# Patient Record
Sex: MALE | Race: WHITE | Employment: UNEMPLOYED
[De-identification: names, ages, dates, MRNs, and addresses within clinical notes are randomized per-mention and may not be internally consistent; named-entity substitution may affect disease eponyms.]

---

## 2022-03-18 PROBLEM — Z86.2 HISTORY OF ANEMIA: Status: ACTIVE | Noted: 2020-09-10

## 2022-03-18 PROBLEM — Z86.0100 HISTORY OF COLON POLYPS: Status: ACTIVE | Noted: 2021-02-15

## 2022-03-18 PROBLEM — Z86.010 HISTORY OF COLON POLYPS: Status: ACTIVE | Noted: 2021-02-15

## 2022-03-19 PROBLEM — R25.1 TREMOR: Status: ACTIVE | Noted: 2020-09-10

## 2022-03-19 PROBLEM — R68.82 DECREASED LIBIDO: Status: ACTIVE | Noted: 2021-02-15

## 2022-03-19 PROBLEM — E66.09 CLASS 2 OBESITY DUE TO EXCESS CALORIES WITHOUT SERIOUS COMORBIDITY WITH BODY MASS INDEX (BMI) OF 36.0 TO 36.9 IN ADULT: Status: ACTIVE | Noted: 2020-09-10

## 2022-03-19 PROBLEM — G89.29 CHRONIC PAIN OF RIGHT KNEE: Status: ACTIVE | Noted: 2020-09-10

## 2022-03-19 PROBLEM — E66.812 CLASS 2 OBESITY DUE TO EXCESS CALORIES WITHOUT SERIOUS COMORBIDITY WITH BODY MASS INDEX (BMI) OF 36.0 TO 36.9 IN ADULT: Status: ACTIVE | Noted: 2020-09-10

## 2022-03-19 PROBLEM — M25.561 CHRONIC PAIN OF RIGHT KNEE: Status: ACTIVE | Noted: 2020-09-10

## 2022-03-20 PROBLEM — R35.1 NOCTURIA: Status: ACTIVE | Noted: 2021-02-15

## 2023-01-27 ENCOUNTER — OFFICE VISIT (OUTPATIENT)
Dept: FAMILY MEDICINE CLINIC | Facility: CLINIC | Age: 68
End: 2023-01-27

## 2023-01-27 VITALS
WEIGHT: 267.2 LBS | RESPIRATION RATE: 19 BRPM | HEART RATE: 82 BPM | DIASTOLIC BLOOD PRESSURE: 115 MMHG | BODY MASS INDEX: 35.41 KG/M2 | SYSTOLIC BLOOD PRESSURE: 185 MMHG | OXYGEN SATURATION: 99 % | HEIGHT: 73 IN | TEMPERATURE: 98.4 F

## 2023-01-27 DIAGNOSIS — G89.29 CHRONIC PAIN OF RIGHT KNEE: Primary | ICD-10-CM

## 2023-01-27 DIAGNOSIS — M25.561 CHRONIC PAIN OF RIGHT KNEE: Primary | ICD-10-CM

## 2023-01-27 DIAGNOSIS — I10 ESSENTIAL (PRIMARY) HYPERTENSION: ICD-10-CM

## 2023-01-27 DIAGNOSIS — M17.11 PRIMARY OSTEOARTHRITIS OF RIGHT KNEE: ICD-10-CM

## 2023-01-27 RX ORDER — TRIAMCINOLONE ACETONIDE 40 MG/ML
40 INJECTION, SUSPENSION INTRA-ARTICULAR; INTRAMUSCULAR ONCE
Status: COMPLETED | OUTPATIENT
Start: 2023-01-27 | End: 2023-01-27

## 2023-01-27 RX ORDER — AMLODIPINE BESYLATE AND BENAZEPRIL HYDROCHLORIDE 5; 10 MG/1; MG/1
1 CAPSULE ORAL DAILY
Qty: 30 CAPSULE | Refills: 5 | Status: SHIPPED | OUTPATIENT
Start: 2023-01-27

## 2023-01-27 RX ADMIN — TRIAMCINOLONE ACETONIDE 40 MG: 40 INJECTION, SUSPENSION INTRA-ARTICULAR; INTRAMUSCULAR at 16:03

## 2023-01-27 SDOH — ECONOMIC STABILITY: FOOD INSECURITY: WITHIN THE PAST 12 MONTHS, YOU WORRIED THAT YOUR FOOD WOULD RUN OUT BEFORE YOU GOT MONEY TO BUY MORE.: NEVER TRUE

## 2023-01-27 SDOH — ECONOMIC STABILITY: FOOD INSECURITY: WITHIN THE PAST 12 MONTHS, THE FOOD YOU BOUGHT JUST DIDN'T LAST AND YOU DIDN'T HAVE MONEY TO GET MORE.: NEVER TRUE

## 2023-01-27 ASSESSMENT — ENCOUNTER SYMPTOMS
SHORTNESS OF BREATH: 0
BACK PAIN: 0

## 2023-01-27 ASSESSMENT — PATIENT HEALTH QUESTIONNAIRE - PHQ9
1. LITTLE INTEREST OR PLEASURE IN DOING THINGS: 0
SUM OF ALL RESPONSES TO PHQ QUESTIONS 1-9: 0
2. FEELING DOWN, DEPRESSED OR HOPELESS: 0
SUM OF ALL RESPONSES TO PHQ9 QUESTIONS 1 & 2: 0

## 2023-01-27 ASSESSMENT — SOCIAL DETERMINANTS OF HEALTH (SDOH): HOW HARD IS IT FOR YOU TO PAY FOR THE VERY BASICS LIKE FOOD, HOUSING, MEDICAL CARE, AND HEATING?: NOT HARD AT ALL

## 2023-01-27 NOTE — PATIENT INSTRUCTIONS
*Blood pressure is elevated today with goal at < 140/90. Limit your sodium intake to less than 2 grams daily. A weight loss of 5-10 pounds can also be helpful. Avoid excessive alcohol and caffeine. Start Lotrel - take once a day. Check your blood pressure 3-4 times a week or more frequently if any problems, record them, and bring them in for us to review at your next visit. *You were given a steroid injection today. Sometimes this can cause a flare of discomfort the day after. You can apply ice (for 10 minutes at a time) and use the Tylenol if needed. Typically it takes 24-48 hours before the steroid \"kicks in\" and provides relief of the symptoms.

## 2023-01-27 NOTE — PROGRESS NOTES
[facility]  Phone [phone]      Patient: Luly Hendrix  YOB: 1955  Age 79 y.o. Sex male  Medical Record:  [id number]  Visit Date: 01/27/23  Author:  Anthony Montague PA-C    [facility] Note    Chief Complaint   Patient presents with    Knee Pain     Right, had first shot from Dr. Kelley Benson in Sept of 2020. Lasted until now. . gradually wore off but thinks he needs another injection     The longer he sits the stiffer his knee gets        History of Present Illness  This is a 29-year-old male who has a history of chronic right knee pain and is presenting today with complaints of an exacerbation. He has known osteoarthritis of this knee. He received a corticosteroid injection in his knee 9/2020 and reports that he did very well afterwards. In fact he states that his pain has been well controlled until the last couple of weeks. He denies any injury or trauma but states that he has pain with weightbearing and is noting stiffness of the knee especially after sitting for long periods of time. He has had some occasional swelling but notes that it has been minimal.  Denies any redness or increased warmth of the overlying skin. Pain is primarily at the medial aspect of the knee. He denies any painful locking, popping, catching or buckling of the knee. In review of patient's chart it appears that he was diagnosed with hypertension previously. He was given a prescription for lisinopril but patient admits that he cannot remember ever taking it. He does not check his blood pressure at home. He denies chest pain, shortness of breath, palpitations or lower extremity edema. His last office visit here was 7/2021.     Past History:    Past Medical history   Past Medical History:   Diagnosis Date    Chronic pain of right knee 9/10/2020    GERD (gastroesophageal reflux disease)     History of anemia 9/10/2020    PUD (peptic ulcer disease)        Current Problem List:   Patient Active Problem List   Diagnosis    History of colon polyps    History of anemia    Tremor    Class 2 obesity due to excess calories without serious comorbidity with body mass index (BMI) of 36.0 to 36.9 in adult    Decreased libido    Chronic pain of right knee    Nocturia       Current Medications: . Current Outpatient Medications   Medication Sig Dispense Refill    diclofenac sodium (VOLTAREN) 1 % GEL Apply 4 g topically 4 times daily       No current facility-administered medications for this visit.         Allergies:No Known Allergies    Surgical History:  Past Surgical History:   Procedure Laterality Date    COLONOSCOPY  2008    Dr. Gt Vila    COLONOSCOPY  02/12/2021    Dr. Gt Vila; 1 polyp; diverticulosis    UROLOGICAL SURGERY Right 3rd grade    undescended testicle       Family History:  Family History   Problem Relation Age of Onset    Hypertension Father     Heart Disease Father         afib; pacer    Stroke Paternal Grandfather     COPD Mother     Cancer Sister         breast    Cancer Father         melanoma       Social History:   Social History     Social History Narrative    Not on file      Social History     Socioeconomic History    Marital status:      Spouse name: Not on file    Number of children: Not on file    Years of education: Not on file    Highest education level: Not on file   Occupational History    Not on file   Tobacco Use    Smoking status: Never    Smokeless tobacco: Former   Substance and Sexual Activity    Alcohol use: No    Drug use: No    Sexual activity: Not on file   Other Topics Concern    Not on file   Social History Narrative    Not on file     Social Determinants of Health     Financial Resource Strain: Low Risk     Difficulty of Paying Living Expenses: Not hard at all   Food Insecurity: No Food Insecurity    Worried About [address] in the Last Year: Never true    [address] [facility] [address] in the Last Year: Never true Transportation Needs: Not on file   Physical Activity: Not on file   Stress: Not on file   Social Connections: Not on file   Intimate Partner Violence: Not on file   Housing Stability: Not on file         ROS  Review of Systems   Constitutional:  Negative for chills and fever. HENT: Negative. Respiratory:  Negative for shortness of breath. Cardiovascular:  Negative for chest pain, palpitations and leg swelling. Musculoskeletal:  Positive for arthralgias. Negative for back pain. Neurological:  Negative for dizziness, weakness, light-headedness, numbness and headaches. BP (!) 179/121   Pulse 82   Temp 98.4 °F (36.9 °C) (Temporal)   Resp 19   Ht 6' 0.5\" (1.842 m)   Wt 267 lb 3.2 oz (121.2 kg)   SpO2 99% Comment: room air sitting  BMI 35.74 kg/m²   Body mass index is 35.74 kg/m². Physical Exam    Physical Exam  Vitals and nursing note reviewed. Constitutional:       Appearance: Normal appearance. He is not ill-appearing. HENT:      Head: Normocephalic. Cardiovascular:      Rate and Rhythm: Normal rate and regular rhythm. Heart sounds: Normal heart sounds. No murmur heard. Pulmonary:      Effort: Pulmonary effort is normal.      Breath sounds: Normal breath sounds. Musculoskeletal:      Cervical back: Neck supple. Comments: Right knee - skin intact. No erythema, edema or increased warmth. No effusion. Patella tracks well. ROM 0- approx 100 degrees. Crepitus noted. There is tenderness at the medial joint line on palpation. Pain at terminal flexion and extension. Ligamentously stable. Anterior/Posterior drawer neg. Lachman's negative. No pain on Bhupinder's. No pain on varus/valgus stress. Distal n/v exam is intact. Lymphadenopathy:      Cervical: No cervical adenopathy. Neurological:      Mental Status: He is alert. Psychiatric:         Mood and Affect: Mood normal.         Behavior: Behavior normal.         Thought Content:  Thought content normal. ASSESSMENT & PLAN    ICD-10-CM    1. Chronic pain of right knee  M25.561 XR KNEE RIGHT (3 VIEWS)    G89.29 triamcinolone acetonide (KENALOG-40) injection 40 mg     DRAIN/INJECT LARGE JOINT/BURSA      2. Primary osteoarthritis of right knee  M17.11       3. Essential (primary) hypertension  I10 amLODIPine-benazepril (LOTREL) 5-10 MG per capsule           1. Chronic pain of right knee  *X-ray of the right knee shows moderate to severe degenerative changes at the medial compartment with milder degenerative changes the patellofemoral and lateral compartments. X-ray will be sent for formal interpretation patient will be notified if there is any changes to the report. *Discussed treatment options. Patient would like to proceed with corticosteroid injection. Risks and benefits were discussed including the chance it may not reduce inflammation and pain. Discussed that cortisone is being used to reduce the inflammation in the joint. The injection works quicker than oral steroids and is more accurate at getting to the point of inflammation. - XR KNEE RIGHT (3 VIEWS)  - triamcinolone acetonide (KENALOG-40) injection 40 mg  - DRAIN/INJECT LARGE JOINT/BURSA    2. Primary osteoarthritis of right knee  *Discussed the pathophysiology and prognosis of osteoarthritis and that it should be managed with a multimodal approach. This may include ice/heat application, bracing, walking aid as needed, topical and oral analgesics, avoiding exacerbating and high impact activities, and various injection therapy including corticosteroid injections. Discussed that if nonoperative measures fail to control pain adequately, referral to orthopedics to discuss other options may be considered. 3. Essential (primary) hypertension  *Blood pressure is significantly elevated on multiple checks here in the office today.   Patient has a history of hypertension but does not recall ever taking the prescription for lisinopril that was previously given.  Discussed the risks of untreated hypertension. We will have him begin Lotrel 5-10 mg once daily. *He is to begin to keep track of his blood pressure at home. Notes that his wife is a nurse and she can check it for him. Check it 3-4 days a week. To write down all results and bring them to next office visit for review. We'd like you to stay < 140/90, and ideally < 130/80. Let us know if you find yourself above this routinely. *To return as scheduled for recheck of his blood pressure and to reestablish regular follow-up with Dr. Harman Ahmadi. - amLODIPine-benazepril (LOTREL) 5-10 MG per capsule; Take 1 capsule by mouth daily  Dispense: 30 capsule; Refill: 5    **Procedure note**  After examination of the right knee, the medical necessity for injection was established. After a discussion of the possible risks and benefits of cortisone injection including but not limited to infection, injection site discomfort, bleeding, swelling, steroid flare, fat atrophy and depigmentation, the patient agreed to the procedure. After suitable skin prep with alcohol and using sterile technique, the skin was anesthetized with Ethyl Chloride and the knee joint was injected with Kenalog 40 mg and Lidocaine 1% 4 cc through a 23 ga needle. Patient tolerated the procedure well and an adhesive bandage was placed over the puncture site. Orders Placed This Encounter   Procedures    DRAIN/INJECT LARGE JOINT/BURSA    XR KNEE RIGHT (3 VIEWS)     I have reviewed the patient's past medical history, social history and family history and vitals. We have discussed treatment plan and follow up and given patient instructions. Patient's questions are answered and we will follow up as indicated. Dictated using voice recognition software. Proof read but unrecognized errors may exist.    Follow-up and Dispositions    Return in about 3 weeks (around 2/17/2023) for f/u HTN.          Sean Garcia PA-C

## 2023-02-15 ENCOUNTER — OFFICE VISIT (OUTPATIENT)
Dept: FAMILY MEDICINE CLINIC | Facility: CLINIC | Age: 68
End: 2023-02-15
Payer: MEDICARE

## 2023-02-15 VITALS
WEIGHT: 264.6 LBS | DIASTOLIC BLOOD PRESSURE: 78 MMHG | HEIGHT: 73 IN | OXYGEN SATURATION: 96 % | BODY MASS INDEX: 35.07 KG/M2 | TEMPERATURE: 97.4 F | SYSTOLIC BLOOD PRESSURE: 128 MMHG | HEART RATE: 83 BPM | RESPIRATION RATE: 18 BRPM

## 2023-02-15 DIAGNOSIS — I10 ESSENTIAL (PRIMARY) HYPERTENSION: Primary | ICD-10-CM

## 2023-02-15 DIAGNOSIS — J20.9 SUBACUTE BRONCHITIS: ICD-10-CM

## 2023-02-15 DIAGNOSIS — E66.01 CLASS 2 SEVERE OBESITY DUE TO EXCESS CALORIES WITH SERIOUS COMORBIDITY AND BODY MASS INDEX (BMI) OF 35.0 TO 35.9 IN ADULT (HCC): ICD-10-CM

## 2023-02-15 PROBLEM — E66.812 CLASS 2 SEVERE OBESITY DUE TO EXCESS CALORIES WITH SERIOUS COMORBIDITY AND BODY MASS INDEX (BMI) OF 35.0 TO 35.9 IN ADULT: Status: ACTIVE | Noted: 2020-09-10

## 2023-02-15 PROCEDURE — G8484 FLU IMMUNIZE NO ADMIN: HCPCS | Performed by: FAMILY MEDICINE

## 2023-02-15 PROCEDURE — 99214 OFFICE O/P EST MOD 30 MIN: CPT | Performed by: FAMILY MEDICINE

## 2023-02-15 PROCEDURE — G8427 DOCREV CUR MEDS BY ELIG CLIN: HCPCS | Performed by: FAMILY MEDICINE

## 2023-02-15 PROCEDURE — 3078F DIAST BP <80 MM HG: CPT | Performed by: FAMILY MEDICINE

## 2023-02-15 PROCEDURE — 1123F ACP DISCUSS/DSCN MKR DOCD: CPT | Performed by: FAMILY MEDICINE

## 2023-02-15 PROCEDURE — G8417 CALC BMI ABV UP PARAM F/U: HCPCS | Performed by: FAMILY MEDICINE

## 2023-02-15 PROCEDURE — 3074F SYST BP LT 130 MM HG: CPT | Performed by: FAMILY MEDICINE

## 2023-02-15 PROCEDURE — 1036F TOBACCO NON-USER: CPT | Performed by: FAMILY MEDICINE

## 2023-02-15 PROCEDURE — 3017F COLORECTAL CA SCREEN DOC REV: CPT | Performed by: FAMILY MEDICINE

## 2023-02-15 RX ORDER — AMLODIPINE BESYLATE AND BENAZEPRIL HYDROCHLORIDE 5; 10 MG/1; MG/1
1 CAPSULE ORAL DAILY
Qty: 90 CAPSULE | Refills: 1 | Status: SHIPPED | OUTPATIENT
Start: 2023-02-15

## 2023-02-15 RX ORDER — AZITHROMYCIN 250 MG/1
250 TABLET, FILM COATED ORAL SEE ADMIN INSTRUCTIONS
Qty: 6 TABLET | Refills: 0 | Status: SHIPPED | OUTPATIENT
Start: 2023-02-15 | End: 2023-02-20

## 2023-02-15 SDOH — ECONOMIC STABILITY: FOOD INSECURITY: WITHIN THE PAST 12 MONTHS, THE FOOD YOU BOUGHT JUST DIDN'T LAST AND YOU DIDN'T HAVE MONEY TO GET MORE.: NEVER TRUE

## 2023-02-15 SDOH — ECONOMIC STABILITY: HOUSING INSECURITY
IN THE LAST 12 MONTHS, WAS THERE A TIME WHEN YOU DID NOT HAVE A STEADY PLACE TO SLEEP OR SLEPT IN A SHELTER (INCLUDING NOW)?: NO

## 2023-02-15 SDOH — ECONOMIC STABILITY: INCOME INSECURITY: HOW HARD IS IT FOR YOU TO PAY FOR THE VERY BASICS LIKE FOOD, HOUSING, MEDICAL CARE, AND HEATING?: NOT HARD AT ALL

## 2023-02-15 SDOH — ECONOMIC STABILITY: FOOD INSECURITY: WITHIN THE PAST 12 MONTHS, YOU WORRIED THAT YOUR FOOD WOULD RUN OUT BEFORE YOU GOT MONEY TO BUY MORE.: NEVER TRUE

## 2023-02-15 ASSESSMENT — PATIENT HEALTH QUESTIONNAIRE - PHQ9
SUM OF ALL RESPONSES TO PHQ9 QUESTIONS 1 & 2: 0
1. LITTLE INTEREST OR PLEASURE IN DOING THINGS: 0
SUM OF ALL RESPONSES TO PHQ QUESTIONS 1-9: 0
2. FEELING DOWN, DEPRESSED OR HOPELESS: 0

## 2023-02-15 NOTE — PROGRESS NOTES
[address] [facility]  Phone: [phone] Fax [phone]  Benjamin Blackburn. Vanessa Brasher M.D.  2/15/2023        Roslyn Sanchez is a 79 y.o. male     HPI:  Patient is seen for Follow-up (3 weeks, HTN)  He has tolerated the Lotrel 5-10 Roberto Likens gave him in January. He has not had any significant swelling or constipation. His monitor blood pressure with improvement noted. States he has lost about 3 and half pounds and would like to lose more before his 50-year high school reunion August 19. He describes stress associated with his wife's mother having fractured her hip at the site of prior replacement. She is currently in a rehab facility at [facility] but Medicare days run out quickly and this has led to stress of what to do for her long-term. In January patient developed some head and chest congestion with cough and body aches. States that he lost his taste. Took 3 at home COVID test with these being negative but feels like he probably had COVID. Now he has a persistent left lower cough that is productive of a thick milky sputum at times. No fever or chills. ROS:  Denies any chest pain dyspnea diaphoresis or edema. No reported palpitations or tachycardia. No abdominal pain. No hematochezia melena diarrhea or constipation. No dysuria or hematuria. Denies any depression.      No Known Allergies    Active Ambulatory Problems     Diagnosis Date Noted    History of colon polyps 02/15/2021    History of anemia 09/10/2020    Tremor 09/10/2020    Class 2 severe obesity due to excess calories with serious comorbidity and body mass index (BMI) of 35.0 to 35.9 in adult [facility]) 09/10/2020    Decreased libido 02/15/2021    Chronic pain of right knee 09/10/2020    Nocturia 02/15/2021    Essential (primary) hypertension 02/15/2023     Resolved Ambulatory Problems     Diagnosis Date Noted    No Resolved Ambulatory Problems     Past Medical History:   Diagnosis Date GERD (gastroesophageal reflux disease)     PUD (peptic ulcer disease)         Past Surgical History:   Procedure Laterality Date    COLONOSCOPY  2008    Dr. Kaitlynn Brizuela    COLONOSCOPY  02/12/2021    Dr. Kaitlynn Brizuela; 1 polyp; diverticulosis    UROLOGICAL SURGERY Right 3rd grade    undescended testicle        Social History     Tobacco Use    Smoking status: Never    Smokeless tobacco: Former   Substance Use Topics    Alcohol use: No    Drug use: No       Physical Exam:  Blood pressure 128/78, pulse 83, temperature 97.4 °F (36.3 °C), temperature source Temporal, resp. rate 18, height 6' 0.5\" (1.842 m), weight 264 lb 9.6 oz (120 kg), SpO2 96 %. Pleasant male in no apparent distress. Affect is appropriate. No palpable sinus tenderness. Nasal mucosa with only mild erythema. Posterior oropharynx similar. No cervical lymphadenopathy or thyromegaly or nodularity. Lungs with good air exchange and only mild rhonchi audible without crackles or wheezes. No JVD or hepatojugular reflux. Cardiovascular regular S1-S2 without murmurs rubs or gallops. Radial pulses 2+. Abdomen is soft, moderately obese, and nontender with positive bowel sounds. No masses palpated. No pitting peripheral edema or cyanosis. Moves all extremities well. Assessment and Plan:   Diagnosis Orders   1. Essential (primary) hypertension  amLODIPine-benazepril (LOTREL) 5-10 MG per capsule      2. Subacute bronchitis  azithromycin (ZITHROMAX) 250 MG tablet      3. Class 2 severe obesity due to excess calories with serious comorbidity and body mass index (BMI) of 35.0 to 35.9 in adult [facility])          Information on finding support for heart healthy changes, bronchitis, starting weight loss plan, and low carbohydrate diet provided. Continue the Lotrel as above with prescription sent to the pharmacy. Encouraged 150 minutes of low impact aerobic activity weekly.   Also encouraged resistance training every other day with 24-48 hours of muscle glucose uptake subsequently. This will assist his dietary efforts and weight loss plans. Treat subacute bronchitis with a Z-Jignesh. Reassess patient here in 3 months fasting and for subsequent wellness exam.      Discharge Meds:  Current Outpatient Medications   Medication Sig Dispense Refill    amLODIPine-benazepril (LOTREL) 5-10 MG per capsule Take 1 capsule by mouth daily 90 capsule 1    azithromycin (ZITHROMAX) 250 MG tablet Take 1 tablet by mouth See Admin Instructions for 5 days 500mg on day 1 followed by 250mg on days 2 - 5 6 tablet 0    diclofenac sodium (VOLTAREN) 1 % GEL Apply 4 g topically 4 times daily       No current facility-administered medications for this visit. Return in about 3 months (around 5/15/2023) for fasting and subwellness. Any new medications were explained today including any potential drug interactions or significant side effects. The patient's questions in regards to this were answered today. Dictated using voice recognition software.   Proofread, but unrecognized errors may exist.

## 2023-09-03 DIAGNOSIS — I10 ESSENTIAL (PRIMARY) HYPERTENSION: ICD-10-CM

## 2023-09-05 RX ORDER — AMLODIPINE BESYLATE AND BENAZEPRIL HYDROCHLORIDE 5; 10 MG/1; MG/1
CAPSULE ORAL
Qty: 90 CAPSULE | Refills: 0 | Status: SHIPPED | OUTPATIENT
Start: 2023-09-05

## 2023-09-07 ENCOUNTER — TELEPHONE (OUTPATIENT)
Dept: FAMILY MEDICINE CLINIC | Facility: CLINIC | Age: 68
End: 2023-09-07

## 2023-09-07 NOTE — TELEPHONE ENCOUNTER
Medication Refill Request      Name of Medication : Amlodipine-Benazepril       Strength of Medication: 5-10 mg       Directions: 1 time daily      30 day or 90 day supply: 90 day supply       Preferred Pharmacy: [address] [place] [address] on [facility]     Additional Information For Provider:

## 2023-09-14 ENCOUNTER — OFFICE VISIT (OUTPATIENT)
Dept: FAMILY MEDICINE CLINIC | Facility: CLINIC | Age: 68
End: 2023-09-14
Payer: MEDICARE

## 2023-09-14 VITALS
RESPIRATION RATE: 18 BRPM | SYSTOLIC BLOOD PRESSURE: 123 MMHG | HEART RATE: 90 BPM | TEMPERATURE: 98.1 F | HEIGHT: 73 IN | DIASTOLIC BLOOD PRESSURE: 86 MMHG | BODY MASS INDEX: 33.48 KG/M2 | OXYGEN SATURATION: 98 % | WEIGHT: 252.6 LBS

## 2023-09-14 DIAGNOSIS — R35.1 NOCTURIA: ICD-10-CM

## 2023-09-14 DIAGNOSIS — R05.2 SUBACUTE COUGH: ICD-10-CM

## 2023-09-14 DIAGNOSIS — I10 ESSENTIAL (PRIMARY) HYPERTENSION: Primary | ICD-10-CM

## 2023-09-14 DIAGNOSIS — G89.29 CHRONIC PAIN OF RIGHT KNEE: ICD-10-CM

## 2023-09-14 DIAGNOSIS — E78.00 ELEVATED LDL CHOLESTEROL LEVEL: ICD-10-CM

## 2023-09-14 DIAGNOSIS — M25.561 CHRONIC PAIN OF RIGHT KNEE: ICD-10-CM

## 2023-09-14 DIAGNOSIS — E66.09 CLASS 1 OBESITY DUE TO EXCESS CALORIES WITH SERIOUS COMORBIDITY AND BODY MASS INDEX (BMI) OF 33.0 TO 33.9 IN ADULT: ICD-10-CM

## 2023-09-14 PROCEDURE — 99214 OFFICE O/P EST MOD 30 MIN: CPT | Performed by: FAMILY MEDICINE

## 2023-09-14 PROCEDURE — 3079F DIAST BP 80-89 MM HG: CPT | Performed by: FAMILY MEDICINE

## 2023-09-14 PROCEDURE — G8427 DOCREV CUR MEDS BY ELIG CLIN: HCPCS | Performed by: FAMILY MEDICINE

## 2023-09-14 PROCEDURE — 1123F ACP DISCUSS/DSCN MKR DOCD: CPT | Performed by: FAMILY MEDICINE

## 2023-09-14 PROCEDURE — G8417 CALC BMI ABV UP PARAM F/U: HCPCS | Performed by: FAMILY MEDICINE

## 2023-09-14 PROCEDURE — 3074F SYST BP LT 130 MM HG: CPT | Performed by: FAMILY MEDICINE

## 2023-09-14 PROCEDURE — 3017F COLORECTAL CA SCREEN DOC REV: CPT | Performed by: FAMILY MEDICINE

## 2023-09-14 PROCEDURE — 20610 DRAIN/INJ JOINT/BURSA W/O US: CPT | Performed by: FAMILY MEDICINE

## 2023-09-14 PROCEDURE — 1036F TOBACCO NON-USER: CPT | Performed by: FAMILY MEDICINE

## 2023-09-14 RX ORDER — AMLODIPINE BESYLATE AND BENAZEPRIL HYDROCHLORIDE 5; 10 MG/1; MG/1
1 CAPSULE ORAL DAILY
Qty: 90 CAPSULE | Refills: 0 | Status: SHIPPED | OUTPATIENT
Start: 2023-09-14

## 2023-09-14 RX ORDER — TRIAMCINOLONE ACETONIDE 40 MG/ML
40 INJECTION, SUSPENSION INTRA-ARTICULAR; INTRAMUSCULAR ONCE
Status: COMPLETED | OUTPATIENT
Start: 2023-09-14 | End: 2023-09-14

## 2023-09-14 RX ADMIN — TRIAMCINOLONE ACETONIDE 40 MG: 40 INJECTION, SUSPENSION INTRA-ARTICULAR; INTRAMUSCULAR at 15:47

## 2023-09-14 NOTE — PROGRESS NOTES
[address], [address] [facility]  Phone: [phone] Fax [phone]  Candida BioRoc Boyce M.D.  9/14/2023        Baudilio Ramos is a 76 y.o. male     HPI:  Patient is seen for Follow-up (7 months, /Wants injection in right knee, had last one in January with Rocael Pickett, )  Patient states that he missed his appointment with us in May. Is not fasting today as he ate about 3 hours prior to the visit. He continues to take his Lotrel as previous with good reduction of blood pressure. He does notice an occasional mild cough but this has not been significantly problematic. Usually this cough is nonproductive. Has not had any specific allergy symptoms and has not noted any recurrent heartburn or reflux. Patient does describe 2-3 episodes of nocturia each night. He is able to go back to sleep when this occurs. No daytime urinary hesitancy or dribbling. Does not desire medication for this. Chart review does show where patient has had elevated LDL in the past.  He has intentionally lost weight as his wife has been diagnosed with diabetes and their dietary intake has changed significantly. Plans on starting to walk/exercise regularly in the near future. Does use a self-propelled walking lawnmower to cut his grass. Patient has had history of chronic right knee pain. He did have a right knee injection per Nelson Hayes this past January. This benefit has started to wear off some and he has been doing more walking recently which includes walking to the [facility] and ascending and descending steps. Would like to have this injected once again. He denies any radicular pain. Right knee hurts mostly along the inferomedial aspect. Had x-ray performed in January prior to injection with significant arthritic findings noted. ROS:  Denies any chest pain dyspnea diaphoresis or persistent edema. No reported palpitations or tachycardia.  No polydipsia or

## 2023-09-15 ENCOUNTER — NURSE ONLY (OUTPATIENT)
Dept: FAMILY MEDICINE CLINIC | Facility: CLINIC | Age: 68
End: 2023-09-15

## 2023-09-15 DIAGNOSIS — I10 ESSENTIAL (PRIMARY) HYPERTENSION: ICD-10-CM

## 2023-09-15 DIAGNOSIS — E78.00 ELEVATED LDL CHOLESTEROL LEVEL: ICD-10-CM

## 2023-09-15 DIAGNOSIS — R35.1 NOCTURIA: ICD-10-CM

## 2023-09-15 LAB
ALBUMIN SERPL-MCNC: 4 G/DL (ref 3.2–4.6)
ALBUMIN/GLOB SERPL: 1.1 (ref 0.4–1.6)
ALP SERPL-CCNC: 80 U/L (ref 50–136)
ALT SERPL-CCNC: 29 U/L (ref 12–65)
ANION GAP SERPL CALC-SCNC: 6 MMOL/L (ref 2–11)
AST SERPL-CCNC: 11 U/L (ref 15–37)
BASOPHILS # BLD: 0 K/UL (ref 0–0.2)
BASOPHILS NFR BLD: 0 % (ref 0–2)
BILIRUB SERPL-MCNC: 0.2 MG/DL (ref 0.2–1.1)
BUN SERPL-MCNC: 16 MG/DL (ref 8–23)
CALCIUM SERPL-MCNC: 11 MG/DL (ref 8.3–10.4)
CHLORIDE SERPL-SCNC: 109 MMOL/L (ref 101–110)
CHOLEST SERPL-MCNC: 165 MG/DL
CO2 SERPL-SCNC: 24 MMOL/L (ref 21–32)
CREAT SERPL-MCNC: 1 MG/DL (ref 0.8–1.5)
DIFFERENTIAL METHOD BLD: ABNORMAL
EOSINOPHIL # BLD: 0 K/UL (ref 0–0.8)
EOSINOPHIL NFR BLD: 0 % (ref 0.5–7.8)
ERYTHROCYTE [DISTWIDTH] IN BLOOD BY AUTOMATED COUNT: 13.2 % (ref 11.9–14.6)
GLOBULIN SER CALC-MCNC: 3.8 G/DL (ref 2.8–4.5)
GLUCOSE SERPL-MCNC: 142 MG/DL (ref 65–100)
HCT VFR BLD AUTO: 47.7 % (ref 41.1–50.3)
HDLC SERPL-MCNC: 55 MG/DL (ref 40–60)
HDLC SERPL: 3
HGB BLD-MCNC: 15.3 G/DL (ref 13.6–17.2)
IMM GRANULOCYTES # BLD AUTO: 0.1 K/UL (ref 0–0.5)
IMM GRANULOCYTES NFR BLD AUTO: 1 % (ref 0–5)
LDLC SERPL CALC-MCNC: 104 MG/DL
LYMPHOCYTES # BLD: 0.7 K/UL (ref 0.5–4.6)
LYMPHOCYTES NFR BLD: 5 % (ref 13–44)
MCH RBC QN AUTO: 27.8 PG (ref 26.1–32.9)
MCHC RBC AUTO-ENTMCNC: 32.1 G/DL (ref 31.4–35)
MCV RBC AUTO: 86.7 FL (ref 82–102)
MONOCYTES # BLD: 0.5 K/UL (ref 0.1–1.3)
MONOCYTES NFR BLD: 4 % (ref 4–12)
NEUTS SEG # BLD: 13 K/UL (ref 1.7–8.2)
NEUTS SEG NFR BLD: 90 % (ref 43–78)
NRBC # BLD: 0 K/UL (ref 0–0.2)
PLATELET # BLD AUTO: 242 K/UL (ref 150–450)
PMV BLD AUTO: 10 FL (ref 9.4–12.3)
POTASSIUM SERPL-SCNC: 5 MMOL/L (ref 3.5–5.1)
PROT SERPL-MCNC: 7.8 G/DL (ref 6.3–8.2)
PSA SERPL-MCNC: 4.2 NG/ML
RBC # BLD AUTO: 5.5 M/UL (ref 4.23–5.6)
SODIUM SERPL-SCNC: 139 MMOL/L (ref 133–143)
TRIGL SERPL-MCNC: 30 MG/DL (ref 35–150)
TSH, 3RD GENERATION: 1.78 UIU/ML (ref 0.36–3.74)
VLDLC SERPL CALC-MCNC: 6 MG/DL (ref 6–23)
WBC # BLD AUTO: 14.3 K/UL (ref 4.3–11.1)

## 2023-12-13 DIAGNOSIS — I10 ESSENTIAL (PRIMARY) HYPERTENSION: ICD-10-CM

## 2023-12-13 RX ORDER — AMLODIPINE BESYLATE AND BENAZEPRIL HYDROCHLORIDE 5; 10 MG/1; MG/1
1 CAPSULE ORAL DAILY
Qty: 30 CAPSULE | Refills: 0 | Status: SHIPPED | OUTPATIENT
Start: 2023-12-13

## 2024-01-09 ENCOUNTER — OFFICE VISIT (OUTPATIENT)
Dept: FAMILY MEDICINE CLINIC | Facility: CLINIC | Age: 69
End: 2024-01-09
Payer: MEDICARE

## 2024-01-09 VITALS
BODY MASS INDEX: 34.59 KG/M2 | OXYGEN SATURATION: 97 % | WEIGHT: 261 LBS | DIASTOLIC BLOOD PRESSURE: 86 MMHG | RESPIRATION RATE: 16 BRPM | SYSTOLIC BLOOD PRESSURE: 127 MMHG | HEIGHT: 73 IN | HEART RATE: 89 BPM | TEMPERATURE: 98.3 F

## 2024-01-09 DIAGNOSIS — I10 ESSENTIAL (PRIMARY) HYPERTENSION: ICD-10-CM

## 2024-01-09 DIAGNOSIS — Z23 NEED FOR PROPHYLACTIC VACCINATION AGAINST STREPTOCOCCUS PNEUMONIAE (PNEUMOCOCCUS): ICD-10-CM

## 2024-01-09 DIAGNOSIS — Z00.00 MEDICARE ANNUAL WELLNESS VISIT, SUBSEQUENT: Primary | ICD-10-CM

## 2024-01-09 DIAGNOSIS — E66.09 CLASS 1 OBESITY DUE TO EXCESS CALORIES WITH SERIOUS COMORBIDITY AND BODY MASS INDEX (BMI) OF 34.0 TO 34.9 IN ADULT: ICD-10-CM

## 2024-01-09 DIAGNOSIS — Z29.11 NEED FOR RSV VACCINATION: ICD-10-CM

## 2024-01-09 DIAGNOSIS — Z23 NEED FOR PROPHYLACTIC VACCINATION AGAINST DIPHTHERIA-TETANUS-PERTUSSIS (DTP): ICD-10-CM

## 2024-01-09 DIAGNOSIS — R97.20 PSA ELEVATION: ICD-10-CM

## 2024-01-09 DIAGNOSIS — E83.52 SERUM CALCIUM ELEVATED: ICD-10-CM

## 2024-01-09 DIAGNOSIS — D72.9 NEUTROPHILIC LEUKOCYTOSIS: ICD-10-CM

## 2024-01-09 DIAGNOSIS — N52.9 ERECTILE DYSFUNCTION, UNSPECIFIED ERECTILE DYSFUNCTION TYPE: ICD-10-CM

## 2024-01-09 DIAGNOSIS — R73.09 ELEVATED GLUCOSE: ICD-10-CM

## 2024-01-09 LAB
BASOPHILS # BLD: 0.1 K/UL (ref 0–0.2)
BASOPHILS NFR BLD: 1 % (ref 0–2)
CALCIUM SERPL-MCNC: 10.4 MG/DL (ref 8.3–10.4)
CALCIUM SERPL-MCNC: 10.7 MG/DL (ref 8.3–10.4)
DIFFERENTIAL METHOD BLD: NORMAL
EOSINOPHIL # BLD: 0.3 K/UL (ref 0–0.8)
EOSINOPHIL NFR BLD: 4 % (ref 0.5–7.8)
ERYTHROCYTE [DISTWIDTH] IN BLOOD BY AUTOMATED COUNT: 12.9 % (ref 11.9–14.6)
HCT VFR BLD AUTO: 45.5 % (ref 41.1–50.3)
HGB BLD-MCNC: 14.9 G/DL (ref 13.6–17.2)
IMM GRANULOCYTES # BLD AUTO: 0.1 K/UL (ref 0–0.5)
IMM GRANULOCYTES NFR BLD AUTO: 1 % (ref 0–5)
LYMPHOCYTES # BLD: 1.5 K/UL (ref 0.5–4.6)
LYMPHOCYTES NFR BLD: 24 % (ref 13–44)
MCH RBC QN AUTO: 29.7 PG (ref 26.1–32.9)
MCHC RBC AUTO-ENTMCNC: 32.7 G/DL (ref 31.4–35)
MCV RBC AUTO: 90.6 FL (ref 82–102)
MONOCYTES # BLD: 0.6 K/UL (ref 0.1–1.3)
MONOCYTES NFR BLD: 9 % (ref 4–12)
NEUTS SEG # BLD: 3.7 K/UL (ref 1.7–8.2)
NEUTS SEG NFR BLD: 61 % (ref 43–78)
NRBC # BLD: 0 K/UL (ref 0–0.2)
PLATELET # BLD AUTO: 224 K/UL (ref 150–450)
PMV BLD AUTO: 9.5 FL (ref 9.4–12.3)
PSA SERPL-MCNC: 6.5 NG/ML
PTH-INTACT SERPL-MCNC: 104.1 PG/ML (ref 18.5–88)
RBC # BLD AUTO: 5.02 M/UL (ref 4.23–5.6)
WBC # BLD AUTO: 6.2 K/UL (ref 4.3–11.1)

## 2024-01-09 PROCEDURE — G8427 DOCREV CUR MEDS BY ELIG CLIN: HCPCS | Performed by: FAMILY MEDICINE

## 2024-01-09 PROCEDURE — G8417 CALC BMI ABV UP PARAM F/U: HCPCS | Performed by: FAMILY MEDICINE

## 2024-01-09 PROCEDURE — 1036F TOBACCO NON-USER: CPT | Performed by: FAMILY MEDICINE

## 2024-01-09 PROCEDURE — 1123F ACP DISCUSS/DSCN MKR DOCD: CPT | Performed by: FAMILY MEDICINE

## 2024-01-09 PROCEDURE — 3074F SYST BP LT 130 MM HG: CPT | Performed by: FAMILY MEDICINE

## 2024-01-09 PROCEDURE — 3079F DIAST BP 80-89 MM HG: CPT | Performed by: FAMILY MEDICINE

## 2024-01-09 PROCEDURE — G0439 PPPS, SUBSEQ VISIT: HCPCS | Performed by: FAMILY MEDICINE

## 2024-01-09 PROCEDURE — 99214 OFFICE O/P EST MOD 30 MIN: CPT | Performed by: FAMILY MEDICINE

## 2024-01-09 PROCEDURE — 3017F COLORECTAL CA SCREEN DOC REV: CPT | Performed by: FAMILY MEDICINE

## 2024-01-09 PROCEDURE — 90677 PCV20 VACCINE IM: CPT | Performed by: FAMILY MEDICINE

## 2024-01-09 PROCEDURE — G0009 ADMIN PNEUMOCOCCAL VACCINE: HCPCS | Performed by: FAMILY MEDICINE

## 2024-01-09 PROCEDURE — G8484 FLU IMMUNIZE NO ADMIN: HCPCS | Performed by: FAMILY MEDICINE

## 2024-01-09 RX ORDER — AMLODIPINE BESYLATE AND BENAZEPRIL HYDROCHLORIDE 5; 10 MG/1; MG/1
1 CAPSULE ORAL DAILY
Qty: 90 CAPSULE | Refills: 2 | Status: SHIPPED | OUTPATIENT
Start: 2024-01-09

## 2024-01-09 RX ORDER — TADALAFIL 5 MG/1
5 TABLET ORAL PRN
Qty: 30 TABLET | Refills: 1 | Status: SHIPPED | OUTPATIENT
Start: 2024-01-09

## 2024-01-09 RX ORDER — AMLODIPINE BESYLATE AND BENAZEPRIL HYDROCHLORIDE 5; 10 MG/1; MG/1
1 CAPSULE ORAL DAILY
Qty: 90 CAPSULE | Refills: 3 | Status: CANCELLED | OUTPATIENT
Start: 2024-01-09

## 2024-01-09 ASSESSMENT — PATIENT HEALTH QUESTIONNAIRE - PHQ9
2. FEELING DOWN, DEPRESSED OR HOPELESS: 0
SUM OF ALL RESPONSES TO PHQ QUESTIONS 1-9: 0
SUM OF ALL RESPONSES TO PHQ QUESTIONS 1-9: 0
SUM OF ALL RESPONSES TO PHQ9 QUESTIONS 1 & 2: 0
SUM OF ALL RESPONSES TO PHQ QUESTIONS 1-9: 0
1. LITTLE INTEREST OR PLEASURE IN DOING THINGS: 0
SUM OF ALL RESPONSES TO PHQ QUESTIONS 1-9: 0

## 2024-01-09 ASSESSMENT — LIFESTYLE VARIABLES
HOW MANY STANDARD DRINKS CONTAINING ALCOHOL DO YOU HAVE ON A TYPICAL DAY: 1 OR 2
HOW OFTEN DO YOU HAVE A DRINK CONTAINING ALCOHOL: MONTHLY OR LESS

## 2024-01-09 NOTE — PROGRESS NOTES
sometimes obtaining and maintaining erection.  Would like to try medication such as Viagra.       ROS:  Denies any chest pain dyspnea diaphoresis or edema.  No reported palpitations or tachycardia. No polydipsia or polyphagia.  No large weight fluctuations.   No abdominal pain. No hematochezia melena diarrhea or constipation. No dysuria or hematuria. Denies any depression.  No recent falls.    No Known Allergies    Active Ambulatory Problems     Diagnosis Date Noted    History of colon polyps 02/15/2021    History of anemia 09/10/2020    Tremor 09/10/2020    Class 2 severe obesity due to excess calories with serious comorbidity and body mass index (BMI) of 35.0 to 35.9 in adult (HCC) 09/10/2020    Decreased libido 02/15/2021    Chronic pain of right knee 09/10/2020    Nocturia 02/15/2021    Essential (primary) hypertension 02/15/2023     Resolved Ambulatory Problems     Diagnosis Date Noted    No Resolved Ambulatory Problems     Past Medical History:   Diagnosis Date    GERD (gastroesophageal reflux disease)     PUD (peptic ulcer disease)         Past Surgical History:   Procedure Laterality Date    COLONOSCOPY  2008    Dr. Argueta    COLONOSCOPY  02/12/2021    Dr. Argueta; 1 polyp; diverticulosis    UROLOGICAL SURGERY Right 3rd grade    undescended testicle        Social History     Tobacco Use    Smoking status: Never    Smokeless tobacco: Former   Substance Use Topics    Alcohol use: No    Drug use: No       Physical Exam:  Blood pressure 127/86, pulse 89, temperature 98.3 °F (36.8 °C), temperature source Temporal, resp. rate 16, height 1.842 m (6' 0.5\"), weight 118.4 kg (261 lb), SpO2 97 %.  Pleasant male in no apparent distress.  Affect is appropriate.  Lower eyelids are not significantly pale.  HEENT grossly normal.  Oral mucosa is moist and intact.  No cervical lymphadenopathy or thyromegaly or nodularity.  Lungs clear.  No JVD or hepatojugular reflux.  Cardiovascular regular S1-S2 without murmurs rubs or

## 2024-01-10 ENCOUNTER — CLINICAL DOCUMENTATION (OUTPATIENT)
Dept: SPIRITUAL SERVICES | Age: 69
End: 2024-01-10

## 2024-01-10 LAB
EST. AVERAGE GLUCOSE BLD GHB EST-MCNC: 111 MG/DL
HBA1C MFR BLD: 5.5 % (ref 4.8–5.6)

## 2024-01-10 NOTE — PROGRESS NOTES
Advance Care Planning   Ambulatory ACP Specialist Patient Outreach    Date:  1/10/2024    ACP Specialist:  Carmen Gaytan    Outreach call to patient in follow-up to ACP Specialist referral from:Nomi Collins MD    [x] PCP  [] Provider   [] Ambulatory Care Management [] Other     For:                  [x] Advance Directive Assistance              [] Complete Portable DNR order              [] Complete POST/POLST/MOST              [] Code Status Discussion             [] Discuss Goals of Care             [] Early ACP Decision-Making              [] Other (Specify)    Date Referral Received:1/9/24    Next Step:   [] ACP scheduled conversation  [x] Outreach again in one week               [] Email / Mail ACP Info Sheets  [] Email / Mail Advance Directive   [] Closing referral.  Routing closure to referring provider/staff and to ACP Specialist .    [] Closure letter mailed to patient with invitation to contact ACP Specialist if / when ready.   [] Other (Specify here):         [x] At this time, Healthcare Decision Maker Is: Advance Care Planning   Healthcare Decision Maker:    Primary Decision Maker: Sonia Godinez - [facility] - [phone]          [] Primary agent named in scanned advance directive.    [x] Legal Next of Kin.     [] Unable to determine legal decision maker at this time.       Outreaches:         [x] 1st -  Date:  1/10/24               Intervention:  [x] Spoke with Patient   [] Left Voice mail [] Email / Mail    [] [employer]  [] Other (Specify) :     Outcomes:  Outreach phone call to the patient on mobile phone number.  Spoke with patient requesting to send ACP information to his e-mail address for review prior to the next follow up phone call on next week.  Will attempt to follow up in one week.           [] 2nd -  Date:                 Intervention:  [] Spoke with Patient  [] Left Voice mail [] Email / Mail    [] [employer]  [] Other (Specify) :              Outcomes:                []

## 2024-01-12 DIAGNOSIS — R79.89 ELEVATED PARATHYROID HORMONE: ICD-10-CM

## 2024-01-12 DIAGNOSIS — E83.52 SERUM CALCIUM ELEVATED: Primary | ICD-10-CM

## 2024-01-12 DIAGNOSIS — R97.20 ELEVATED PSA: ICD-10-CM

## 2024-01-12 NOTE — RESULT ENCOUNTER NOTE
Call back intact PTH elevated with serum calcium elevated.  I will refer patient to endocrinologist for further evaluation.  Also PSA has increased from 4.2 up to 6.5.  I would like urologist to evaluate further and will make this referral.  White blood cell count normal.  Hemoglobin stable at 14.9.

## 2024-01-17 ENCOUNTER — CLINICAL DOCUMENTATION (OUTPATIENT)
Dept: SPIRITUAL SERVICES | Age: 69
End: 2024-01-17

## 2024-01-23 ENCOUNTER — CLINICAL DOCUMENTATION (OUTPATIENT)
Dept: SPIRITUAL SERVICES | Age: 69
End: 2024-01-23

## 2024-01-23 ENCOUNTER — OFFICE VISIT (OUTPATIENT)
Dept: ENDOCRINOLOGY | Age: 69
End: 2024-01-23
Payer: MEDICARE

## 2024-01-23 VITALS
DIASTOLIC BLOOD PRESSURE: 98 MMHG | HEIGHT: 73 IN | HEART RATE: 84 BPM | SYSTOLIC BLOOD PRESSURE: 144 MMHG | WEIGHT: 263 LBS | OXYGEN SATURATION: 100 % | RESPIRATION RATE: 18 BRPM | BODY MASS INDEX: 34.85 KG/M2

## 2024-01-23 DIAGNOSIS — E21.0 PRIMARY HYPERPARATHYROIDISM (HCC): Primary | ICD-10-CM

## 2024-01-23 DIAGNOSIS — E21.0 PRIMARY HYPERPARATHYROIDISM (HCC): ICD-10-CM

## 2024-01-23 LAB
25(OH)D3 SERPL-MCNC: 20.5 NG/ML (ref 30–100)
ALBUMIN SERPL-MCNC: 3.8 G/DL (ref 3.2–4.6)
ALBUMIN/GLOB SERPL: 1.2 (ref 0.4–1.6)
ALP SERPL-CCNC: 82 U/L (ref 50–136)
ALT SERPL-CCNC: 28 U/L (ref 12–65)
ANION GAP SERPL CALC-SCNC: 2 MMOL/L (ref 2–11)
AST SERPL-CCNC: 18 U/L (ref 15–37)
BILIRUB SERPL-MCNC: 0.3 MG/DL (ref 0.2–1.1)
BUN SERPL-MCNC: 18 MG/DL (ref 8–23)
CALCIUM SERPL-MCNC: 10.4 MG/DL (ref 8.3–10.4)
CHLORIDE SERPL-SCNC: 108 MMOL/L (ref 103–113)
CO2 SERPL-SCNC: 28 MMOL/L (ref 21–32)
CREAT SERPL-MCNC: 0.8 MG/DL (ref 0.8–1.5)
GLOBULIN SER CALC-MCNC: 3.2 G/DL (ref 2.8–4.5)
GLUCOSE SERPL-MCNC: 106 MG/DL (ref 65–100)
PHOSPHATE SERPL-MCNC: 2.7 MG/DL (ref 2.3–3.7)
POTASSIUM SERPL-SCNC: 4.1 MMOL/L (ref 3.5–5.1)
PROT SERPL-MCNC: 7 G/DL (ref 6.3–8.2)
SODIUM SERPL-SCNC: 138 MMOL/L (ref 136–146)

## 2024-01-23 PROCEDURE — 1123F ACP DISCUSS/DSCN MKR DOCD: CPT | Performed by: INTERNAL MEDICINE

## 2024-01-23 PROCEDURE — G8484 FLU IMMUNIZE NO ADMIN: HCPCS | Performed by: INTERNAL MEDICINE

## 2024-01-23 PROCEDURE — 1036F TOBACCO NON-USER: CPT | Performed by: INTERNAL MEDICINE

## 2024-01-23 PROCEDURE — 3077F SYST BP >= 140 MM HG: CPT | Performed by: INTERNAL MEDICINE

## 2024-01-23 PROCEDURE — 99203 OFFICE O/P NEW LOW 30 MIN: CPT | Performed by: INTERNAL MEDICINE

## 2024-01-23 PROCEDURE — G8427 DOCREV CUR MEDS BY ELIG CLIN: HCPCS | Performed by: INTERNAL MEDICINE

## 2024-01-23 PROCEDURE — G8417 CALC BMI ABV UP PARAM F/U: HCPCS | Performed by: INTERNAL MEDICINE

## 2024-01-23 PROCEDURE — 3017F COLORECTAL CA SCREEN DOC REV: CPT | Performed by: INTERNAL MEDICINE

## 2024-01-23 PROCEDURE — 3080F DIAST BP >= 90 MM HG: CPT | Performed by: INTERNAL MEDICINE

## 2024-01-23 PROCEDURE — G2211 COMPLEX E/M VISIT ADD ON: HCPCS | Performed by: INTERNAL MEDICINE

## 2024-01-23 ASSESSMENT — ENCOUNTER SYMPTOMS
BLOOD IN STOOL: 0
DIARRHEA: 0
CONSTIPATION: 0

## 2024-01-23 NOTE — PROGRESS NOTES
MIRIAN Vale MD, FACE    [facility] ENDOCRINOLOGY   AND   [facility]YROID NODULE CLINIC            Reason for visit: Vijay Godinez is referred by Nomi Collins MD for the evaluation and management of elevated serum calcium and elevated parathyroid hormone.        ASSESSMENT AND PLAN:    1. Primary hyperparathyroidism (HCC)  Mr. Godinez has hypercalcemia associated with nonsuppressed parathyroid hormone, diagnostic of primary hyperparathyroidism.  This has been present since at least 2021.  I have no records prior to that.  I will check calcium, phosphorus, parathyroid hormone, and vitamin D today.  I will also arrange 24-hour urine collection for calcium as well as bone densitometry.  Unless she possesses an indication for surgery, I will see her back in 6 months with repeat labs.  I will plan to provide him with longitudinal care for this problem.  - Comprehensive Metabolic Panel; Future  - Vitamin D 25 Hydroxy; Future  - PTH, Intact; Future  - Phosphorus; Future  - DEXA BONE DENSITY AXIAL SKELETON; Future  - Calcium, 24 HR Urine; Future  - Creatinine, 24 HR Urine; Future  - Comprehensive Metabolic Panel; Future  - Vitamin D 25 Hydroxy; Future  - Phosphorus; Future      Follow-up and Dispositions    Return in about 6 months (around 7/23/2024).         History of Present Illness:    HYPERCALCEMIA  Vijay Godinez is here for new evaluation of hypercalcemia.    Presentation/diagnosis: Present since at least February 2021 (no records prior to that available)    Related comorbidies/clinical features:   -Metabolic bone disease: no prior assessment  -Fragility fractures: none (traumatic left ankle fracture and left wrist fracture)  -Nephrolithiasis: none  -Renal dysfunction: none  -Thiazide diuretic use: none  -Calcium/vitamin D supplementation: Emergen-C once or twice weekly    Bone densitometry/DXA: None prior    Symptoms:  See review of systems below    Labs:  2/15/2021: Calcium 10.4.  9/15/2023: Calcium

## 2024-01-24 DIAGNOSIS — E55.9 VITAMIN D DEFICIENCY: Primary | ICD-10-CM

## 2024-01-24 LAB
CALCIUM SERPL-MCNC: 10.2 MG/DL (ref 8.3–10.4)
PTH-INTACT SERPL-MCNC: 101 PG/ML (ref 18.5–88)

## 2024-01-24 RX ORDER — ERGOCALCIFEROL 1.25 MG/1
50000 CAPSULE ORAL WEEKLY
Qty: 12 CAPSULE | Refills: 1 | Status: SHIPPED | OUTPATIENT
Start: 2024-01-24

## 2024-01-24 NOTE — PATIENT INSTRUCTIONS
know your test results and keep a list of the medicines you take.  How can you care for yourself at home?  Diet    Use less salt when you cook and eat. This helps lower your blood pressure. Taste food before salting. Add only a little salt when you think you need it. With time, your taste buds will adjust to less salt.     Eat fewer snack items, fast foods, canned soups, and other high-salt, high-fat, processed foods.     Read food labels and try to avoid saturated and trans fats. They increase your risk of heart disease by raising cholesterol levels.     Limit the amount of solid fat-butter, margarine, and shortening-you eat. Use olive, peanut, or canola oil when you cook. Bake, broil, and steam foods instead of frying them.     Eat a variety of fruit and vegetables every day. Dark green, deep orange, red, or yellow fruits and vegetables are especially good for you. Examples include spinach, carrots, peaches, and berries.     Foods high in fiber can reduce your cholesterol and provide important vitamins and minerals. High-fiber foods include whole-grain cereals and breads, oatmeal, beans, brown rice, citrus fruits, and apples.     Eat lean proteins. Heart-healthy proteins include seafood, lean meats and poultry, eggs, beans, peas, nuts, seeds, and soy products.     Limit drinks and foods with added sugar. These include candy, desserts, and soda pop.   Lifestyle changes    If your doctor recommends it, get more exercise. Walking is a good choice. Bit by bit, increase the amount you walk every day. Try for at least 30 minutes on most days of the week. You also may want to swim, bike, or do other activities.     Do not smoke. If you need help quitting, talk to your doctor about stop-smoking programs and medicines. These can increase your chances of quitting for good. Quitting smoking may be the most important step you can take to protect your heart. It is never too late to quit.     Limit alcohol to 2 drinks a day for  Family

## 2024-02-05 DIAGNOSIS — E21.0 PRIMARY HYPERPARATHYROIDISM (HCC): ICD-10-CM

## 2024-02-05 LAB
COLLECT DURATION TIME UR: 24 HR
CREAT 24H UR-MRATE: 910 MG/24 HR (ref 800–2000)
CREAT UR-MCNC: 70 MG/DL
SPECIMEN VOL ?TM UR: 1300 ML

## 2024-02-06 ENCOUNTER — HOSPITAL ENCOUNTER (OUTPATIENT)
Dept: MAMMOGRAPHY | Age: 69
Discharge: HOME | End: 2024-02-09
Attending: INTERNAL MEDICINE
Payer: MEDICARE

## 2024-02-06 DIAGNOSIS — E21.0 PRIMARY HYPERPARATHYROIDISM (HCC): ICD-10-CM

## 2024-02-06 PROCEDURE — 77080 DXA BONE DENSITY AXIAL: CPT

## 2024-02-07 LAB
CALCIUM 24H UR-MCNC: 31 MG/DL
CALCIUM 24H UR-MRATE: 868 MG/24 HR (ref 0–320)
COLLECT DURATION TIME UR: 24 HR
SPECIMEN VOL ?TM UR: 2800 ML

## 2024-02-08 ENCOUNTER — TELEPHONE (OUTPATIENT)
Dept: UROLOGY | Age: 69
End: 2024-02-08

## 2024-02-08 NOTE — TELEPHONE ENCOUNTER
Called and left a message for mr. Godinez. Told him we changed his appt to next Thursday @ 3:00 instead of today. (02/08)

## 2024-02-15 ENCOUNTER — OFFICE VISIT (OUTPATIENT)
Dept: UROLOGY | Age: 69
End: 2024-02-15
Payer: MEDICARE

## 2024-02-15 DIAGNOSIS — R97.20 ELEVATED PSA: ICD-10-CM

## 2024-02-15 DIAGNOSIS — N52.9 ERECTILE DYSFUNCTION, UNSPECIFIED ERECTILE DYSFUNCTION TYPE: ICD-10-CM

## 2024-02-15 DIAGNOSIS — R97.20 ELEVATED PSA: Primary | ICD-10-CM

## 2024-02-15 LAB
BILIRUBIN, URINE, POC: NEGATIVE
BLOOD URINE, POC: NORMAL
GLUCOSE URINE, POC: NEGATIVE
KETONES, URINE, POC: NEGATIVE
LEUKOCYTE ESTERASE, URINE, POC: NORMAL
NITRITE, URINE, POC: NEGATIVE
PH, URINE, POC: 6 (ref 4.6–8)
PROTEIN,URINE, POC: NEGATIVE
SPECIFIC GRAVITY, URINE, POC: 1.03 (ref 1–1.03)
URINALYSIS CLARITY, POC: NORMAL
URINALYSIS COLOR, POC: NORMAL
UROBILINOGEN, POC: NORMAL

## 2024-02-15 PROCEDURE — G8427 DOCREV CUR MEDS BY ELIG CLIN: HCPCS | Performed by: UROLOGY

## 2024-02-15 PROCEDURE — G8417 CALC BMI ABV UP PARAM F/U: HCPCS | Performed by: UROLOGY

## 2024-02-15 PROCEDURE — 3017F COLORECTAL CA SCREEN DOC REV: CPT | Performed by: UROLOGY

## 2024-02-15 PROCEDURE — 1123F ACP DISCUSS/DSCN MKR DOCD: CPT | Performed by: UROLOGY

## 2024-02-15 PROCEDURE — 99204 OFFICE O/P NEW MOD 45 MIN: CPT | Performed by: UROLOGY

## 2024-02-15 PROCEDURE — 1036F TOBACCO NON-USER: CPT | Performed by: UROLOGY

## 2024-02-15 PROCEDURE — G8484 FLU IMMUNIZE NO ADMIN: HCPCS | Performed by: UROLOGY

## 2024-02-15 PROCEDURE — 81003 URINALYSIS AUTO W/O SCOPE: CPT | Performed by: UROLOGY

## 2024-02-15 NOTE — PROGRESS NOTES
flank pain, recurrent UTIs, history of urolithiasis, nocturia, slower stream, straining, urgency, leakage w/ urge, frequent urination, incomplete emptying, erectile dysfunction, testicular pain, sexually transmitted disease, sexually transmitted disease, discharge, urethral stricture, menstrual problem, endometriosis, vaginal pain and hysterectomy.  Musculoskeletal:  Negative for back pain, bone pain, arthralgias, tenderness, muscle weakness and neck pain.  Neurological:  Negative for dizziness, focal weakness, numbness, seizures and tremors.  Psychological:  Negative for depression and psychiatric problem.  Endocrine:  Negative for cold intolerance, thirst, excessive urination and fatigue.  Hem/Lymphatic:  Negative for easy bleeding, easy bruising and frequent infections.        Physical Exam  There were no vitals taken for this visit.  General appearance - alert, well appearing, and in no distress  Mental status - alert, oriented to person, place, and time  Eyes - extraocular eye movements intact, sclera anicteric  Nose - normal and patent, no erythema, discharge or polyps  Mouth - mucous membranes moist  Abdomen - soft, nontender, nondistended, no masses or organomegaly  - R testicular atrophy.  No testicular masses.  Lymphatic-  No palpable lymphadenopathy  Neurological -  normal speech, no focal findings or movement disorder noted  Musculoskeletal - no deformity or swelling  Extremities - no pedal edema, no clubbing or cyanosis  Skin - normal coloration and turgor      Urinalysis  UA - Dipstick  Results for orders placed or performed in visit on 02/15/24   AMB POC URINALYSIS DIP STICK AUTO W/O MICRO   Result Value Ref Range    Color (UA POC)      Clarity (UA POC)      Glucose, Urine, POC Negative     Bilirubin, Urine, POC Negative     KETONES, Urine, POC Negative     Specific Gravity, Urine, POC 1.030 1.001 - 1.035    Blood (UA POC) Trace-intact     pH, Urine, POC 6.0 4.6 - 8.0    Protein, Urine, POC Negative

## 2024-02-16 DIAGNOSIS — R97.20 ELEVATED PSA: Primary | ICD-10-CM

## 2024-02-16 LAB
PSA FREE MFR SERPL: 22.4 %
PSA FREE SERPL-MCNC: 1.1 NG/ML
PSA SERPL-MCNC: 4.9 NG/ML

## 2024-02-23 ENCOUNTER — TELEPHONE (OUTPATIENT)
Dept: ENDOCRINOLOGY | Age: 69
End: 2024-02-23

## 2024-02-23 NOTE — TELEPHONE ENCOUNTER
Phone call from patient stating he has more questions about the referral for the surgeon hyperparathyroid removal.

## 2024-02-26 ENCOUNTER — OFFICE VISIT (OUTPATIENT)
Dept: FAMILY MEDICINE CLINIC | Facility: CLINIC | Age: 69
End: 2024-02-26
Payer: MEDICARE

## 2024-02-26 VITALS
OXYGEN SATURATION: 97 % | SYSTOLIC BLOOD PRESSURE: 139 MMHG | DIASTOLIC BLOOD PRESSURE: 92 MMHG | RESPIRATION RATE: 17 BRPM | WEIGHT: 261.4 LBS | HEART RATE: 94 BPM | HEIGHT: 73 IN | BODY MASS INDEX: 34.64 KG/M2 | TEMPERATURE: 97.9 F

## 2024-02-26 DIAGNOSIS — R09.81 CONGESTION OF NASAL SINUS: Primary | ICD-10-CM

## 2024-02-26 DIAGNOSIS — J01.00 ACUTE MAXILLARY SINUSITIS, RECURRENCE NOT SPECIFIED: ICD-10-CM

## 2024-02-26 LAB
EXP DATE SOLUTION: NORMAL
EXP DATE SWAB: NORMAL
EXPIRATION DATE: NORMAL
INFLUENZA A ANTIGEN, POC: NEGATIVE
INFLUENZA B ANTIGEN, POC: NEGATIVE
LOT NUMBER POC: NORMAL
LOT NUMBER SOLUTION: NORMAL
LOT NUMBER SWAB: NORMAL
SARS-COV-2 RNA, POC: NEGATIVE
VALID INTERNAL CONTROL, POC: YES

## 2024-02-26 PROCEDURE — 3075F SYST BP GE 130 - 139MM HG: CPT | Performed by: PHYSICIAN ASSISTANT

## 2024-02-26 PROCEDURE — 87635 SARS-COV-2 COVID-19 AMP PRB: CPT | Performed by: PHYSICIAN ASSISTANT

## 2024-02-26 PROCEDURE — G8484 FLU IMMUNIZE NO ADMIN: HCPCS | Performed by: PHYSICIAN ASSISTANT

## 2024-02-26 PROCEDURE — 3017F COLORECTAL CA SCREEN DOC REV: CPT | Performed by: PHYSICIAN ASSISTANT

## 2024-02-26 PROCEDURE — G8427 DOCREV CUR MEDS BY ELIG CLIN: HCPCS | Performed by: PHYSICIAN ASSISTANT

## 2024-02-26 PROCEDURE — 87804 INFLUENZA ASSAY W/OPTIC: CPT | Performed by: PHYSICIAN ASSISTANT

## 2024-02-26 PROCEDURE — 1036F TOBACCO NON-USER: CPT | Performed by: PHYSICIAN ASSISTANT

## 2024-02-26 PROCEDURE — G8417 CALC BMI ABV UP PARAM F/U: HCPCS | Performed by: PHYSICIAN ASSISTANT

## 2024-02-26 PROCEDURE — 99213 OFFICE O/P EST LOW 20 MIN: CPT | Performed by: PHYSICIAN ASSISTANT

## 2024-02-26 PROCEDURE — 3080F DIAST BP >= 90 MM HG: CPT | Performed by: PHYSICIAN ASSISTANT

## 2024-02-26 PROCEDURE — 1123F ACP DISCUSS/DSCN MKR DOCD: CPT | Performed by: PHYSICIAN ASSISTANT

## 2024-02-26 RX ORDER — CEFUROXIME AXETIL 500 MG/1
500 TABLET ORAL 2 TIMES DAILY
Qty: 20 TABLET | Refills: 0 | Status: SHIPPED | OUTPATIENT
Start: 2024-02-26 | End: 2024-03-07

## 2024-02-26 SDOH — ECONOMIC STABILITY: INCOME INSECURITY: HOW HARD IS IT FOR YOU TO PAY FOR THE VERY BASICS LIKE FOOD, HOUSING, MEDICAL CARE, AND HEATING?: NOT HARD AT ALL

## 2024-02-26 SDOH — ECONOMIC STABILITY: FOOD INSECURITY: WITHIN THE PAST 12 MONTHS, YOU WORRIED THAT YOUR FOOD WOULD RUN OUT BEFORE YOU GOT MONEY TO BUY MORE.: NEVER TRUE

## 2024-02-26 SDOH — ECONOMIC STABILITY: TRANSPORTATION INSECURITY
IN THE PAST 12 MONTHS, HAS LACK OF TRANSPORTATION KEPT YOU FROM MEETINGS, WORK, OR FROM GETTING THINGS NEEDED FOR DAILY LIVING?: NO

## 2024-02-26 SDOH — ECONOMIC STABILITY: FOOD INSECURITY: WITHIN THE PAST 12 MONTHS, THE FOOD YOU BOUGHT JUST DIDN'T LAST AND YOU DIDN'T HAVE MONEY TO GET MORE.: NEVER TRUE

## 2024-02-26 ASSESSMENT — PATIENT HEALTH QUESTIONNAIRE - PHQ9
2. FEELING DOWN, DEPRESSED OR HOPELESS: 0
SUM OF ALL RESPONSES TO PHQ QUESTIONS 1-9: 0
SUM OF ALL RESPONSES TO PHQ9 QUESTIONS 1 & 2: 0
SUM OF ALL RESPONSES TO PHQ QUESTIONS 1-9: 0
9. THOUGHTS THAT YOU WOULD BE BETTER OFF DEAD, OR OF HURTING YOURSELF: 0
1. LITTLE INTEREST OR PLEASURE IN DOING THINGS: 0

## 2024-02-26 ASSESSMENT — ENCOUNTER SYMPTOMS
RHINORRHEA: 1
COUGH: 1
EYE DISCHARGE: 0
WHEEZING: 0
SORE THROAT: 0
CHEST TIGHTNESS: 0
SHORTNESS OF BREATH: 0
SINUS PRESSURE: 1
SINUS PAIN: 1

## 2024-02-26 NOTE — PROGRESS NOTES
Lack of Transportation (Non-Medical): No   Physical Activity: Insufficiently Active (1/9/2024)    Exercise Vital Sign     Days of Exercise per Week: 1 day     Minutes of Exercise per Session: 30 min   Stress: Not on file   Social Connections: Not on file   Intimate Partner Violence: Not on file   Housing Stability: Unknown (2/26/2024)    Housing Stability Vital Sign     Unable to Pay for Housing in the Last Year: Not on file     Number of Places Lived in the Last Year: Not on file     Unstable Housing in the Last Year: No       ROS  Review of Systems   Constitutional:  Positive for fatigue. Negative for chills and fever.   HENT:  Positive for congestion, postnasal drip, rhinorrhea, sinus pressure and sinus pain. Negative for ear discharge, ear pain and sore throat.    Eyes:  Negative for discharge.   Respiratory:  Positive for cough. Negative for chest tightness, shortness of breath and wheezing.    Cardiovascular:  Negative for chest pain.   Musculoskeletal:  Negative for myalgias.     BP (!) 139/92   Pulse 94   Temp 97.9 °F (36.6 °C)   Resp 17   Ht 1.854 m (6' 1\")   Wt 118.6 kg (261 lb 6.4 oz)   SpO2 97%   BMI 34.49 kg/m²   Body mass index is 34.49 kg/m².     Physical Exam    Physical Exam  Vitals and nursing note reviewed.   Constitutional:       General: He is not in acute distress.     Appearance: He is not toxic-appearing.   HENT:      Head: Normocephalic.      Right Ear: Tympanic membrane, ear canal and external ear normal.      Left Ear: Tympanic membrane, ear canal and external ear normal.      Nose: Congestion present.      Right Sinus: Maxillary sinus tenderness present. No frontal sinus tenderness.      Left Sinus: Maxillary sinus tenderness present. No frontal sinus tenderness.      Mouth/Throat:      Pharynx: Oropharynx is clear.   Eyes:      Conjunctiva/sclera: Conjunctivae normal.   Cardiovascular:      Rate and Rhythm: Normal rate and regular rhythm.      Heart sounds: Normal heart sounds.

## 2024-02-26 NOTE — PATIENT INSTRUCTIONS
*Take the antibiotic (Ceftin) as prescribed. Recommend Tylenol for fever or pain, Mucinex DM, over the counter, for cough and congestion. Consider use of over the counter Flonase - 2 sprays per nostril once a day. If sore throat is present, may mix 1/2 to 1 tsp of table salt in 8oz of warm water and gargle every 1-2 hours. Do not swallow the salt water, it could cause nausea. Make sure to wash hands frequently. Please return if symptoms don't improve or if they worsen.

## 2024-03-01 ENCOUNTER — TELEPHONE (OUTPATIENT)
Dept: ENDOCRINOLOGY | Age: 69
End: 2024-03-01

## 2024-03-01 DIAGNOSIS — E21.0 PRIMARY HYPERPARATHYROIDISM (HCC): Primary | ICD-10-CM

## 2024-03-01 DIAGNOSIS — M81.0 AGE-RELATED OSTEOPOROSIS WITHOUT CURRENT PATHOLOGICAL FRACTURE: ICD-10-CM

## 2024-04-01 ENCOUNTER — OFFICE VISIT (OUTPATIENT)
Dept: ENT CLINIC | Age: 69
End: 2024-04-01
Payer: MEDICARE

## 2024-04-01 VITALS
WEIGHT: 262 LBS | HEIGHT: 73 IN | BODY MASS INDEX: 34.72 KG/M2 | DIASTOLIC BLOOD PRESSURE: 88 MMHG | SYSTOLIC BLOOD PRESSURE: 132 MMHG

## 2024-04-01 DIAGNOSIS — E21.0 PRIMARY HYPERPARATHYROIDISM (HCC): Primary | ICD-10-CM

## 2024-04-01 PROCEDURE — 99204 OFFICE O/P NEW MOD 45 MIN: CPT | Performed by: OTOLARYNGOLOGY

## 2024-04-01 PROCEDURE — 3075F SYST BP GE 130 - 139MM HG: CPT | Performed by: OTOLARYNGOLOGY

## 2024-04-01 PROCEDURE — 1036F TOBACCO NON-USER: CPT | Performed by: OTOLARYNGOLOGY

## 2024-04-01 PROCEDURE — 3079F DIAST BP 80-89 MM HG: CPT | Performed by: OTOLARYNGOLOGY

## 2024-04-01 PROCEDURE — G8417 CALC BMI ABV UP PARAM F/U: HCPCS | Performed by: OTOLARYNGOLOGY

## 2024-04-01 PROCEDURE — 1123F ACP DISCUSS/DSCN MKR DOCD: CPT | Performed by: OTOLARYNGOLOGY

## 2024-04-01 PROCEDURE — G8428 CUR MEDS NOT DOCUMENT: HCPCS | Performed by: OTOLARYNGOLOGY

## 2024-04-01 PROCEDURE — 3017F COLORECTAL CA SCREEN DOC REV: CPT | Performed by: OTOLARYNGOLOGY

## 2024-04-01 ASSESSMENT — ENCOUNTER SYMPTOMS
WHEEZING: 0
COUGH: 0
EYE PAIN: 0
VOMITING: 0
DIARRHEA: 0
COLOR CHANGE: 0

## 2024-04-01 NOTE — PROGRESS NOTES
Chief Complaint   Patient presents with    Thyroid Problem     Patient was referred by  for over active hyperparathyroidism.        HPI:  Vijay Godinez is a 68 y.o. male seen today in initial consultation at the request of Dr. Vale in endocrinology for primary hyperparathyroidism.  His serum calcium has been elevated since at least 2021, and he has not elevated PTH as well-most recently 101.0.  He had a recent DEXA scan which confirmed osteoporosis.  He denies any long bone pain and has not had any recent bony fractures.  There is no chronic kidney disease or history of nephrolithiasis, although he does report frequent urination.  There is no family history of thyroid cancer.  He suffered a duodenal ulcer and has dealt with pancreatitis in the past, but denies any chronic GI symptoms currently.  His only other complaint today is chronic fatigue.  He has not yet had any parathyroid imaging studies.    Past Medical History, Past Surgical History, Family history, Social History, and Medications were all reviewed with the patient today and updated as necessary.     No Known Allergies  Patient Active Problem List   Diagnosis    History of colon polyps    History of anemia    Tremor    Class 2 severe obesity due to excess calories with serious comorbidity and body mass index (BMI) of 35.0 to 35.9 in adult (HCC)    Decreased libido    Chronic pain of right knee    Nocturia    Essential (primary) hypertension    Primary hyperparathyroidism (HCC)     Current Outpatient Medications   Medication Sig    vitamin D (ERGOCALCIFEROL) 1.25 MG (19265 UT) CAPS capsule Take 1 capsule by mouth once a week    tadalafil (CIALIS) 5 MG tablet Take 1 tablet by mouth as needed for Erectile Dysfunction    amLODIPine-benazepril (LOTREL) 5-10 MG per capsule Take 1 capsule by mouth daily    diclofenac sodium (VOLTAREN) 1 % GEL Apply 4 g topically 4 times daily     No current facility-administered medications for this visit.     Past

## 2024-04-15 ENCOUNTER — HOSPITAL ENCOUNTER (OUTPATIENT)
Dept: NUCLEAR MEDICINE | Age: 69
Discharge: HOME | End: 2024-04-18
Attending: OTOLARYNGOLOGY
Payer: MEDICARE

## 2024-04-15 ENCOUNTER — HOSPITAL ENCOUNTER (OUTPATIENT)
Dept: ULTRASOUND IMAGING | Age: 69
Discharge: HOME | End: 2024-04-18
Attending: OTOLARYNGOLOGY
Payer: MEDICARE

## 2024-04-15 DIAGNOSIS — E21.0 PRIMARY HYPERPARATHYROIDISM (HCC): ICD-10-CM

## 2024-04-15 PROCEDURE — 76536 US EXAM OF HEAD AND NECK: CPT

## 2024-04-15 PROCEDURE — 78072 PARATHYRD PLANAR W/SPECT&CT: CPT

## 2024-04-15 PROCEDURE — 3430000000 HC RX DIAGNOSTIC RADIOPHARMACEUTICAL: Performed by: OTOLARYNGOLOGY

## 2024-04-15 PROCEDURE — A9500 TC99M SESTAMIBI: HCPCS | Performed by: OTOLARYNGOLOGY

## 2024-04-15 RX ORDER — TETRAKIS(2-METHOXYISOBUTYLISOCYANIDE)COPPER(I) TETRAFLUOROBORATE 1 MG/ML
21 INJECTION, POWDER, LYOPHILIZED, FOR SOLUTION INTRAVENOUS
Status: COMPLETED | OUTPATIENT
Start: 2024-04-15 | End: 2024-04-15

## 2024-04-15 RX ADMIN — KIT FOR THE PREPARATION OF TECHNETIUM TC99M SESTAMIBI 21 MILLICURIE: 1 INJECTION, POWDER, LYOPHILIZED, FOR SOLUTION PARENTERAL at 08:30

## 2024-04-16 DIAGNOSIS — E21.0 PRIMARY HYPERPARATHYROIDISM (HCC): Primary | ICD-10-CM

## 2024-04-22 ENCOUNTER — HOSPITAL ENCOUNTER (OUTPATIENT)
Dept: CT IMAGING | Age: 69
Discharge: HOME | End: 2024-04-25
Attending: OTOLARYNGOLOGY
Payer: MEDICARE

## 2024-04-22 DIAGNOSIS — E21.0 PRIMARY HYPERPARATHYROIDISM (HCC): ICD-10-CM

## 2024-04-22 LAB — CREAT BLD-MCNC: 0.61 MG/DL (ref 0.8–1.5)

## 2024-04-22 PROCEDURE — 82565 ASSAY OF CREATININE: CPT

## 2024-04-22 PROCEDURE — 6360000004 HC RX CONTRAST MEDICATION: Performed by: OTOLARYNGOLOGY

## 2024-04-22 PROCEDURE — 70492 CT SFT TSUE NCK W/O & W/DYE: CPT

## 2024-04-22 RX ADMIN — IOPAMIDOL 80 ML: 755 INJECTION, SOLUTION INTRAVENOUS at 10:59

## 2024-04-26 ENCOUNTER — PREP FOR PROCEDURE (OUTPATIENT)
Dept: ENT CLINIC | Age: 69
End: 2024-04-26

## 2024-04-26 DIAGNOSIS — E21.0 PRIMARY HYPERPARATHYROIDISM (HCC): Primary | ICD-10-CM

## 2024-04-29 ENCOUNTER — HOSPITAL ENCOUNTER (OUTPATIENT)
Dept: SURGERY | Age: 69
Discharge: HOME | End: 2024-05-02
Payer: MEDICARE

## 2024-04-29 VITALS
WEIGHT: 263 LBS | BODY MASS INDEX: 33.75 KG/M2 | DIASTOLIC BLOOD PRESSURE: 102 MMHG | HEIGHT: 74 IN | RESPIRATION RATE: 16 BRPM | TEMPERATURE: 97.7 F | SYSTOLIC BLOOD PRESSURE: 156 MMHG | HEART RATE: 88 BPM | OXYGEN SATURATION: 97 %

## 2024-04-29 LAB
ALBUMIN SERPL-MCNC: 3.7 G/DL (ref 3.2–4.6)
ALBUMIN/GLOB SERPL: 1.2 (ref 1–1.9)
ALP SERPL-CCNC: 73 U/L (ref 40–129)
ALT SERPL-CCNC: 29 U/L (ref 12–65)
ANION GAP SERPL CALC-SCNC: 12 MMOL/L (ref 9–18)
AST SERPL-CCNC: 24 U/L (ref 15–37)
BILIRUB SERPL-MCNC: 0.3 MG/DL (ref 0–1.2)
BUN SERPL-MCNC: 13 MG/DL (ref 8–23)
CALCIUM SERPL-MCNC: 10.1 MG/DL (ref 8.8–10.2)
CHLORIDE SERPL-SCNC: 103 MMOL/L (ref 98–107)
CO2 SERPL-SCNC: 22 MMOL/L (ref 20–28)
CREAT SERPL-MCNC: 0.79 MG/DL (ref 0.8–1.3)
GLOBULIN SER CALC-MCNC: 3.2 G/DL (ref 2.3–3.5)
GLUCOSE SERPL-MCNC: 107 MG/DL (ref 70–99)
HGB BLD-MCNC: 15.2 G/DL (ref 13.6–17.2)
POTASSIUM SERPL-SCNC: 4.2 MMOL/L (ref 3.5–5.1)
PROT SERPL-MCNC: 6.9 G/DL (ref 6.3–8.2)
SODIUM SERPL-SCNC: 137 MMOL/L (ref 136–145)

## 2024-04-29 PROCEDURE — 80053 COMPREHEN METABOLIC PANEL: CPT

## 2024-04-29 PROCEDURE — 85018 HEMOGLOBIN: CPT

## 2024-04-29 NOTE — PROGRESS NOTES
Patient verified name and     Order for consent not found in EHR; patient verifies surgeon removing RIGHT parathyroid, case listing is \"Bilateral parathyroidectomy.\" Case message sent to surgery scheduler. Chart flagged for CN f/u.    Type 1B surgery, Walk-in assessment complete.    Labs per surgeon: none  Labs per anesthesia protocol: CMP, Hgb; results pending  EKG: Not required per anesthesia protocol        Patient provided with and instructed on educational handouts including Guide to Surgery, Preventing Surgical Site Infections, Pain Management, and [facility] Brochure.    Patient answered medical/surgical history questions at their best of ability. All prior to admission medications documented in EPIC. Original medication prescription bottle not visualized during patient appointment.     Patient instructed to hold all vitamins 7 days prior to surgery and NSAIDS 5 days prior to surgery, patient verbalized understanding.     Patient teach back successful and patient demonstrates knowledge of instructions.    PLEASE CONTINUE TAKING ALL PRESCRIPTION MEDICATIONS UP TO THE DAY OF SURGERY UNLESS OTHERWISE DIRECTED BELOW. You may take Tylenol, allergy,  and/or indigestion medications.     TAKE ONLY THESE MEDICATIONS ON THE DAY OF SURGERY               DISCONTINUE all vitamins and supplements 7 days prior to surgery. DISCONTINUE Non-Steroidal Anti-Inflammatory (NSAIDS) such as Advil and Aleve 5 days prior to surgery.     Home Medications to Hold- please continue all other medications except these.    Hold tadalafil 5 days prior to surgery        Comments   Susan Hex shower the night before and morning of surgery.   On the day before surgery (Wednesday) please take 2 Tylenol in the morning and then again before bed. You may use either regular or extra strength.           Please do not bring home medications with you on the day of surgery unless otherwise directed by your nurse.  If you are instructed to

## 2024-05-08 ENCOUNTER — NURSE ONLY (OUTPATIENT)
Dept: UROLOGY | Age: 69
End: 2024-05-08

## 2024-05-08 ENCOUNTER — ANESTHESIA EVENT (OUTPATIENT)
Dept: SURGERY | Age: 69
End: 2024-05-08
Payer: MEDICARE

## 2024-05-08 DIAGNOSIS — G89.18 POST-OP PAIN: Primary | ICD-10-CM

## 2024-05-08 DIAGNOSIS — R97.20 ELEVATED PSA: ICD-10-CM

## 2024-05-08 LAB — PSA SERPL-MCNC: 6.3 NG/ML (ref 0–4)

## 2024-05-08 RX ORDER — HYDROCODONE BITARTRATE AND ACETAMINOPHEN 5; 325 MG/1; MG/1
1 TABLET ORAL EVERY 4 HOURS PRN
Qty: 30 TABLET | Refills: 0 | Status: SHIPPED | OUTPATIENT
Start: 2024-05-08 | End: 2024-05-13

## 2024-05-08 RX ORDER — ONDANSETRON 4 MG/1
4 TABLET, ORALLY DISINTEGRATING ORAL 3 TIMES DAILY PRN
Qty: 10 TABLET | Refills: 0 | Status: SHIPPED | OUTPATIENT
Start: 2024-05-08

## 2024-05-08 RX ORDER — CEPHALEXIN 500 MG/1
500 CAPSULE ORAL 4 TIMES DAILY
Qty: 28 CAPSULE | Refills: 0 | Status: SHIPPED | OUTPATIENT
Start: 2024-05-08

## 2024-05-09 ENCOUNTER — ANESTHESIA (OUTPATIENT)
Dept: SURGERY | Age: 69
End: 2024-05-09
Payer: MEDICARE

## 2024-05-09 ENCOUNTER — HOSPITAL ENCOUNTER (OUTPATIENT)
Age: 69
Setting detail: OUTPATIENT SURGERY
Discharge: HOME | End: 2024-05-09
Attending: OTOLARYNGOLOGY | Admitting: OTOLARYNGOLOGY
Payer: MEDICARE

## 2024-05-09 VITALS
OXYGEN SATURATION: 96 % | DIASTOLIC BLOOD PRESSURE: 68 MMHG | TEMPERATURE: 98 F | HEART RATE: 87 BPM | SYSTOLIC BLOOD PRESSURE: 138 MMHG | RESPIRATION RATE: 18 BRPM

## 2024-05-09 DIAGNOSIS — E21.0 PRIMARY HYPERPARATHYROIDISM (HCC): ICD-10-CM

## 2024-05-09 LAB
IO-PTH BASELINE: 116 PG/ML (ref 15–65)
PTH-INTACT P EXCISION SERPL-MCNC: 24 PG/ML (ref 15–65)
SPECIMEN DRAWN SERPL: 715
SPECIMEN DRAWN SERPL: 815

## 2024-05-09 PROCEDURE — 6360000002 HC RX W HCPCS: Performed by: REGISTERED NURSE

## 2024-05-09 PROCEDURE — 2580000003 HC RX 258: Performed by: ANESTHESIOLOGY

## 2024-05-09 PROCEDURE — 3700000000 HC ANESTHESIA ATTENDED CARE: Performed by: OTOLARYNGOLOGY

## 2024-05-09 PROCEDURE — 7100000011 HC PHASE II RECOVERY - ADDTL 15 MIN: Performed by: OTOLARYNGOLOGY

## 2024-05-09 PROCEDURE — 88305 TISSUE EXAM BY PATHOLOGIST: CPT

## 2024-05-09 PROCEDURE — 2500000003 HC RX 250 WO HCPCS: Performed by: REGISTERED NURSE

## 2024-05-09 PROCEDURE — 83970 ASSAY OF PARATHORMONE: CPT

## 2024-05-09 PROCEDURE — 6370000000 HC RX 637 (ALT 250 FOR IP): Performed by: ANESTHESIOLOGY

## 2024-05-09 PROCEDURE — 7100000010 HC PHASE II RECOVERY - FIRST 15 MIN: Performed by: OTOLARYNGOLOGY

## 2024-05-09 PROCEDURE — 7100000001 HC PACU RECOVERY - ADDTL 15 MIN: Performed by: OTOLARYNGOLOGY

## 2024-05-09 PROCEDURE — 3700000001 HC ADD 15 MINUTES (ANESTHESIA): Performed by: OTOLARYNGOLOGY

## 2024-05-09 PROCEDURE — 2709999900 HC NON-CHARGEABLE SUPPLY: Performed by: OTOLARYNGOLOGY

## 2024-05-09 PROCEDURE — 88331 PATH CONSLTJ SURG 1 BLK 1SPC: CPT

## 2024-05-09 PROCEDURE — 60500 EXPLORE PARATHYROID GLANDS: CPT | Performed by: OTOLARYNGOLOGY

## 2024-05-09 PROCEDURE — 2500000003 HC RX 250 WO HCPCS: Performed by: OTOLARYNGOLOGY

## 2024-05-09 PROCEDURE — 3600000014 HC SURGERY LEVEL 4 ADDTL 15MIN: Performed by: OTOLARYNGOLOGY

## 2024-05-09 PROCEDURE — 2720000010 HC SURG SUPPLY STERILE: Performed by: OTOLARYNGOLOGY

## 2024-05-09 PROCEDURE — 3600000004 HC SURGERY LEVEL 4 BASE: Performed by: OTOLARYNGOLOGY

## 2024-05-09 PROCEDURE — 7100000000 HC PACU RECOVERY - FIRST 15 MIN: Performed by: OTOLARYNGOLOGY

## 2024-05-09 RX ORDER — FENTANYL CITRATE 50 UG/ML
100 INJECTION, SOLUTION INTRAMUSCULAR; INTRAVENOUS
Status: DISCONTINUED | OUTPATIENT
Start: 2024-05-09 | End: 2024-05-09 | Stop reason: HOSPADM

## 2024-05-09 RX ORDER — LIDOCAINE HYDROCHLORIDE 10 MG/ML
1 INJECTION, SOLUTION INFILTRATION; PERINEURAL
Status: DISCONTINUED | OUTPATIENT
Start: 2024-05-09 | End: 2024-05-09 | Stop reason: HOSPADM

## 2024-05-09 RX ORDER — PROCHLORPERAZINE EDISYLATE 5 MG/ML
5 INJECTION INTRAMUSCULAR; INTRAVENOUS
Status: DISCONTINUED | OUTPATIENT
Start: 2024-05-09 | End: 2024-05-09 | Stop reason: HOSPADM

## 2024-05-09 RX ORDER — NALOXONE HYDROCHLORIDE 0.4 MG/ML
INJECTION, SOLUTION INTRAMUSCULAR; INTRAVENOUS; SUBCUTANEOUS PRN
Status: DISCONTINUED | OUTPATIENT
Start: 2024-05-09 | End: 2024-05-09 | Stop reason: HOSPADM

## 2024-05-09 RX ORDER — HALOPERIDOL 5 MG/ML
1 INJECTION INTRAMUSCULAR
Status: DISCONTINUED | OUTPATIENT
Start: 2024-05-09 | End: 2024-05-09 | Stop reason: HOSPADM

## 2024-05-09 RX ORDER — ACETAMINOPHEN 500 MG
1000 TABLET ORAL ONCE
Status: COMPLETED | OUTPATIENT
Start: 2024-05-09 | End: 2024-05-09

## 2024-05-09 RX ORDER — ONDANSETRON 2 MG/ML
INJECTION INTRAMUSCULAR; INTRAVENOUS PRN
Status: DISCONTINUED | OUTPATIENT
Start: 2024-05-09 | End: 2024-05-09 | Stop reason: SDUPTHER

## 2024-05-09 RX ORDER — LIDOCAINE HYDROCHLORIDE 20 MG/ML
INJECTION, SOLUTION EPIDURAL; INFILTRATION; INTRACAUDAL; PERINEURAL PRN
Status: DISCONTINUED | OUTPATIENT
Start: 2024-05-09 | End: 2024-05-09 | Stop reason: SDUPTHER

## 2024-05-09 RX ORDER — EPHEDRINE SULFATE 5 MG/ML
INJECTION INTRAVENOUS PRN
Status: DISCONTINUED | OUTPATIENT
Start: 2024-05-09 | End: 2024-05-09 | Stop reason: SDUPTHER

## 2024-05-09 RX ORDER — HYDROMORPHONE HYDROCHLORIDE 2 MG/ML
0.5 INJECTION, SOLUTION INTRAMUSCULAR; INTRAVENOUS; SUBCUTANEOUS EVERY 5 MIN PRN
Status: DISCONTINUED | OUTPATIENT
Start: 2024-05-09 | End: 2024-05-09 | Stop reason: HOSPADM

## 2024-05-09 RX ORDER — PROPOFOL 10 MG/ML
INJECTION, EMULSION INTRAVENOUS PRN
Status: DISCONTINUED | OUTPATIENT
Start: 2024-05-09 | End: 2024-05-09 | Stop reason: SDUPTHER

## 2024-05-09 RX ORDER — SODIUM CHLORIDE 0.9 % (FLUSH) 0.9 %
5-40 SYRINGE (ML) INJECTION PRN
Status: DISCONTINUED | OUTPATIENT
Start: 2024-05-09 | End: 2024-05-09 | Stop reason: HOSPADM

## 2024-05-09 RX ORDER — SODIUM CHLORIDE, SODIUM LACTATE, POTASSIUM CHLORIDE, CALCIUM CHLORIDE 600; 310; 30; 20 MG/100ML; MG/100ML; MG/100ML; MG/100ML
INJECTION, SOLUTION INTRAVENOUS CONTINUOUS
Status: DISCONTINUED | OUTPATIENT
Start: 2024-05-09 | End: 2024-05-09 | Stop reason: HOSPADM

## 2024-05-09 RX ORDER — SODIUM CHLORIDE 0.9 % (FLUSH) 0.9 %
5-40 SYRINGE (ML) INJECTION EVERY 12 HOURS SCHEDULED
Status: DISCONTINUED | OUTPATIENT
Start: 2024-05-09 | End: 2024-05-09 | Stop reason: HOSPADM

## 2024-05-09 RX ORDER — DEXAMETHASONE SODIUM PHOSPHATE 10 MG/ML
INJECTION INTRAMUSCULAR; INTRAVENOUS PRN
Status: DISCONTINUED | OUTPATIENT
Start: 2024-05-09 | End: 2024-05-09 | Stop reason: SDUPTHER

## 2024-05-09 RX ORDER — SODIUM CHLORIDE 9 MG/ML
INJECTION, SOLUTION INTRAVENOUS PRN
Status: DISCONTINUED | OUTPATIENT
Start: 2024-05-09 | End: 2024-05-09 | Stop reason: HOSPADM

## 2024-05-09 RX ORDER — MIDAZOLAM HYDROCHLORIDE 2 MG/2ML
2 INJECTION, SOLUTION INTRAMUSCULAR; INTRAVENOUS
Status: DISCONTINUED | OUTPATIENT
Start: 2024-05-09 | End: 2024-05-09 | Stop reason: HOSPADM

## 2024-05-09 RX ORDER — SUCCINYLCHOLINE/SOD CL,ISO/PF 200MG/10ML
SYRINGE (ML) INTRAVENOUS PRN
Status: DISCONTINUED | OUTPATIENT
Start: 2024-05-09 | End: 2024-05-09 | Stop reason: SDUPTHER

## 2024-05-09 RX ORDER — OXYCODONE HYDROCHLORIDE 5 MG/1
5 TABLET ORAL
Status: COMPLETED | OUTPATIENT
Start: 2024-05-09 | End: 2024-05-09

## 2024-05-09 RX ADMIN — EPHEDRINE SULFATE 5 MG: 5 INJECTION INTRAVENOUS at 07:14

## 2024-05-09 RX ADMIN — LIDOCAINE HYDROCHLORIDE 70 MG: 20 INJECTION, SOLUTION EPIDURAL; INFILTRATION; INTRACAUDAL; PERINEURAL at 07:03

## 2024-05-09 RX ADMIN — PROPOFOL 200 MG: 10 INJECTION, EMULSION INTRAVENOUS at 07:03

## 2024-05-09 RX ADMIN — PROPOFOL 30 MG: 10 INJECTION, EMULSION INTRAVENOUS at 07:54

## 2024-05-09 RX ADMIN — PROPOFOL 20 MG: 10 INJECTION, EMULSION INTRAVENOUS at 07:39

## 2024-05-09 RX ADMIN — ONDANSETRON 4 MG: 2 INJECTION INTRAMUSCULAR; INTRAVENOUS at 08:50

## 2024-05-09 RX ADMIN — OXYCODONE 5 MG: 5 TABLET ORAL at 09:28

## 2024-05-09 RX ADMIN — PHENYLEPHRINE HYDROCHLORIDE 100 MCG: 0.1 INJECTION, SOLUTION INTRAVENOUS at 07:11

## 2024-05-09 RX ADMIN — SODIUM CHLORIDE, SODIUM LACTATE, POTASSIUM CHLORIDE, AND CALCIUM CHLORIDE: 600; 310; 30; 20 INJECTION, SOLUTION INTRAVENOUS at 08:56

## 2024-05-09 RX ADMIN — PHENYLEPHRINE HYDROCHLORIDE 100 MCG: 0.1 INJECTION, SOLUTION INTRAVENOUS at 07:27

## 2024-05-09 RX ADMIN — FENTANYL CITRATE 100 MCG: 50 INJECTION INTRAMUSCULAR; INTRAVENOUS at 07:03

## 2024-05-09 RX ADMIN — Medication 180 MG: at 07:04

## 2024-05-09 RX ADMIN — FENTANYL CITRATE 50 MCG: 50 INJECTION INTRAMUSCULAR; INTRAVENOUS at 07:34

## 2024-05-09 RX ADMIN — PROPOFOL 20 MG: 10 INJECTION, EMULSION INTRAVENOUS at 08:25

## 2024-05-09 RX ADMIN — EPHEDRINE SULFATE 5 MG: 5 INJECTION INTRAVENOUS at 07:45

## 2024-05-09 RX ADMIN — FENTANYL CITRATE 25 MCG: 50 INJECTION INTRAMUSCULAR; INTRAVENOUS at 09:10

## 2024-05-09 RX ADMIN — DEXAMETHASONE SODIUM PHOSPHATE 10 MG: 10 INJECTION INTRAMUSCULAR; INTRAVENOUS at 07:19

## 2024-05-09 RX ADMIN — PHENYLEPHRINE HYDROCHLORIDE 100 MCG: 0.1 INJECTION, SOLUTION INTRAVENOUS at 08:05

## 2024-05-09 RX ADMIN — SODIUM CHLORIDE, SODIUM LACTATE, POTASSIUM CHLORIDE, AND CALCIUM CHLORIDE: 600; 310; 30; 20 INJECTION, SOLUTION INTRAVENOUS at 05:58

## 2024-05-09 RX ADMIN — FENTANYL CITRATE 25 MCG: 50 INJECTION INTRAMUSCULAR; INTRAVENOUS at 08:11

## 2024-05-09 RX ADMIN — PHENYLEPHRINE HYDROCHLORIDE 100 MCG: 0.1 INJECTION, SOLUTION INTRAVENOUS at 07:14

## 2024-05-09 RX ADMIN — PHENYLEPHRINE HYDROCHLORIDE 100 MCG: 0.1 INJECTION, SOLUTION INTRAVENOUS at 07:20

## 2024-05-09 RX ADMIN — PHENYLEPHRINE HYDROCHLORIDE 50 MCG: 0.1 INJECTION, SOLUTION INTRAVENOUS at 07:43

## 2024-05-09 RX ADMIN — ACETAMINOPHEN 1000 MG: 500 TABLET, FILM COATED ORAL at 05:59

## 2024-05-09 ASSESSMENT — PAIN DESCRIPTION - DESCRIPTORS
DESCRIPTORS: SORE
DESCRIPTORS: SORE

## 2024-05-09 ASSESSMENT — PAIN - FUNCTIONAL ASSESSMENT
PAIN_FUNCTIONAL_ASSESSMENT: 0-10

## 2024-05-09 ASSESSMENT — PAIN DESCRIPTION - ORIENTATION: ORIENTATION: ANTERIOR

## 2024-05-09 ASSESSMENT — PAIN SCALES - GENERAL: PAINLEVEL_OUTOF10: 3

## 2024-05-09 ASSESSMENT — PAIN DESCRIPTION - LOCATION: LOCATION: NECK

## 2024-05-09 NOTE — H&P
concerning lesions.  Nose: No external deviations or saddling. Intranasally, septum is midline without perforations, nasal mucosa appears healthy with no erythema, mucopurulence, or polyps.  Mouth: Moist mucus membranes, normal tongue/palate mobility, no concerning mucosal lesions. Oropharynx clear with no erythema/exudate, no tonsillar hypertrophy.  Ears: Normal appearing auricles, no hematomas. EACs clear with no cerumen impaction, healthy canal skin, TM's intact with no perforations or retraction pockets. No middle ear effusions.  Neck: Soft, supple, no palpable lateral neck masses. No palpable parotid or submandibular masses. No thyromegaly or palpable thyroid nodules. No surgical scars.  Lymphatics: No palpable cervical LAD.  Resp: No audible stridor or wheezing.  CV: No murmurs, no JVD.  Extremities: No clubbing or cyanosis.           Lab Results   Component Value Date     IPTH 101.0 (H) 01/23/2024     CALCIUM 10.2 01/23/2024     CALCIUM 10.4 01/23/2024     PHOS 2.7 01/23/2024         IMAGING:  DEXA scan- 2/6/24     IMPRESSION:  Using the World Health Organization criteria, the bone mineral density is  osteoporotic.     10 year probability of major osteoporotic fracture: 14.6%  10 year probability of hip fracture: 4.4%    A/P: he has primary HPTH  and meets criteria for parathyroidectomy. His CT scan was most concerning for R inferior adenoma. I recommend proceeding w/ parathyroidectomy w/ NIMS and intra-operative PTH monitoring. I discussed all the risks of parathyroid surgery including bleeding/hematoma, infection, inability to find the adenoma or hyperplastic glands with continued hypercalcemia, post-op hypocalcemia, and hoarseness, and they would like to proceed.       HTC

## 2024-05-09 NOTE — ANESTHESIA POSTPROCEDURE EVALUATION
Department of Anesthesiology  Postprocedure Note    Patient: Vijay Godinez  MRN: [id number]  YOB: 1955  Date of evaluation: 5/9/2024    Procedure Summary       Date: 05/09/24 Room / Location: [facility] MAIN OR  / [facility] MAIN OR    Anesthesia Start: 0651 Anesthesia Stop: 0916    Procedure: PARATHYROIDECTOMY (Neck) Diagnosis:       Primary hyperparathyroidism (HCC)      (Primary hyperparathyroidism (HCC) [E21.0])    Providers: Phillip Lam MD Responsible Provider: Isak Anderson MD    Anesthesia Type: general ASA Status: 2            Anesthesia Type: No value filed.    Olivia Phase I: Olivia Score: 8    Olivia Phase II:      Anesthesia Post Evaluation    Patient location during evaluation: PACU  Patient participation: complete - patient participated  Level of consciousness: awake and alert  Pain score: 2  Airway patency: patent  Nausea & Vomiting: no nausea  Cardiovascular status: blood pressure returned to baseline and hemodynamically stable  Respiratory status: acceptable  Hydration status: euvolemic  Multimodal analgesia pain management approach  Pain management: adequate and satisfactory to patient    No notable events documented.

## 2024-05-09 NOTE — ANESTHESIA PROCEDURE NOTES
Arterial Line:    An arterial line was placed using ultrasound guidance and surface landmarks, in the OR for the following indication(s): continuous blood pressure monitoring and blood sampling needed.    A 20 gauge (size), 1 and 3/8 inch (length) (type) catheter was placed, Seldinger technique used, into the left radial artery, secured by Tegaderm and tape.  Anesthesia type: General    Events:  patient tolerated procedure well with no complications and EBL < 5mL.5/9/2024 7:08 AM5/9/2024 7:15 AM  Anesthesiologist: Isak Anderson MD  Performed: Anesthesiologist   Preanesthetic Checklist  Completed: patient identified, IV checked, site marked, risks and benefits discussed, surgical/procedural consents, equipment checked, pre-op evaluation, timeout performed, anesthesia consent given, oxygen available, monitors applied/VS acknowledged and blood product R/B/A discussed and consented

## 2024-05-09 NOTE — DISCHARGE INSTRUCTIONS
-There are steri-strips in place over your neck incision. All of the sutures are deep to these steri strips. You may shower and bathe as long as these steri strips remain clean and dry  -No strenuous activity or heavy lifting for 1 week  -Please start with soft foods and advance diet  -You may experience some numbness/tingling of the extremities or perioral region as your calcium drops to a normal level over the next week. You may take Tums as needed to help ease these symptoms.    Your Recovery  Parathyroidectomy is the removal of one or more of the four parathyroid glands in the neck. These small glands, located on the thyroid gland, help control the amount of calcium in the body. When they are too active, these glands cause high levels of calcium. This is called hyperparathyroidism (say \"hy-per-pair-iw-NYE-brux-iz-um\").  You may have some trouble chewing and swallowing after you go home. Your voice probably will be hoarse, and you may have trouble talking. For most people, these problems get better within a few weeks, but it can take longer. In some cases, this surgery causes permanent problems with chewing, speaking, or swallowing.  This care sheet gives you a general idea about how long it will take for you to recover. But each person recovers at a different pace. Follow the steps below to get better as quickly as possible.  How can you care for yourself at home?  Activity    Rest when you feel tired. Getting enough sleep will help you recover. When you lie down, put two or three pillows under your head to keep it raised.     Try to walk each day. Start by walking a little more than you did the day before. Bit by bit, increase the amount you walk. Walking boosts blood flow and helps prevent pneumonia and constipation.     Avoid strenuous physical activity and lifting heavy objects for 3 weeks after surgery or until your doctor says it is okay.     Do not over-extend your neck backwards for 2 weeks after

## 2024-05-09 NOTE — ANESTHESIA PRE PROCEDURE
Screen (If Applicable):  No results found for: \"LABABO\"    Drug/Infectious Status (If Applicable):  Lab Results   Component Value Date/Time    HEPCAB <0.1 02/15/2021 11:13 AM       COVID-19 Screening (If Applicable): No results found for: \"COVID19\"        Anesthesia Evaluation  Patient summary reviewed and Nursing notes reviewed  Airway: Mallampati: II  TM distance: >3 FB   Neck ROM: full  Mouth opening: > = 3 FB   Dental:    (+) poor dentition      Pulmonary:Negative Pulmonary ROS breath sounds clear to auscultation                             Cardiovascular:  Exercise tolerance: good (>4 METS)  (+) hypertension: mild        Rhythm: regular  Rate: normal                 ROS comment: Denies CP, SOB or changes in functional status     Neuro/Psych:   (+) psychiatric history: stable with treatment            GI/Hepatic/Renal:   (+) GERD: well controlled, PUD          Endo/Other:                      ROS comment: Hyperparathyroidism Abdominal:             Vascular:          Other Findings:       Anesthesia Plan      general     ASA 2       Induction: intravenous.  arterial line  MIPS: Postoperative opioids intended and Prophylactic antiemetics administered.  Anesthetic plan and risks discussed with spouse and patient.    Use of blood products discussed with patient whom consented to blood products.                  Isak Anderson MD   5/9/2024

## 2024-05-09 NOTE — OP NOTE
nerve monitoring tube was placed and its position was confirmed within the glottis using the glide scope.  Once the patient was adequately sedated, a total of 8 cc of 1% lidocaine with 1-100,000 epinephrine was injected along the planned incision line.  He was then sterilely prepped and draped in usual fashion.    I began by designing a 3.5 cm incision along a natural neck skin crease, approximately 2 fingerbreadths above the sternal notch.  I incised through the skin and dermis with a 15 blade and then dissected through some subcutaneous fat and platysma muscle using Bovie electrocautery.  Next superior and inferiorly based subplatysmal skin flaps were raised for improved exposure.  I dissected along the midline raphae and lateralize the strap muscles.  Both anterior jugular veins were able to be preserved.  I dissected down onto a normal-appearing thyroid isthmus.  As his CT scan revealed concern for a possible right inferior parathyroid adenoma, I began my dissection on that side.  I carefully elevated the strap muscles away from the right thyroid lobe and dissected laterally towards the carotid artery.  The right middle thyroid vein was ligated divided for improved exposure.  I dissected down onto the anterior tracheal wall and followed this plane from medial to lateral as I dissected out a nodular right inferior pole.  The right inferior thyroid vein was ligated divided.  I identified the carotid artery which had a relatively medial course.  Between the carotid artery and trachea, identified a large area of adipose tissue with a suspicious appearing right inferior parathyroid gland within this fatty tissue.  I used careful blunt dissection and bipolar electrocautery to carefully dissect this suspected right inferior parathyroid adenoma away from the underlying adipose tissue.  It was removed en bloc and passed off for frozen section analysis labeled right inferior neck mass.    The anesthesia team had placed on

## 2024-05-15 ENCOUNTER — OFFICE VISIT (OUTPATIENT)
Dept: UROLOGY | Age: 69
End: 2024-05-15
Payer: MEDICARE

## 2024-05-15 ENCOUNTER — OFFICE VISIT (OUTPATIENT)
Dept: ENT CLINIC | Age: 69
End: 2024-05-15

## 2024-05-15 DIAGNOSIS — Z90.89 S/P PARATHYROIDECTOMY: ICD-10-CM

## 2024-05-15 DIAGNOSIS — Z98.890 S/P PARATHYROIDECTOMY: ICD-10-CM

## 2024-05-15 DIAGNOSIS — R97.20 ELEVATED PSA: Primary | ICD-10-CM

## 2024-05-15 DIAGNOSIS — Z98.890 S/P PARATHYROIDECTOMY: Primary | ICD-10-CM

## 2024-05-15 DIAGNOSIS — Z90.89 S/P PARATHYROIDECTOMY: Primary | ICD-10-CM

## 2024-05-15 DIAGNOSIS — N52.9 ERECTILE DYSFUNCTION, UNSPECIFIED ERECTILE DYSFUNCTION TYPE: ICD-10-CM

## 2024-05-15 LAB
BILIRUBIN, URINE, POC: NEGATIVE
BLOOD URINE, POC: NORMAL
CALCIUM SERPL-MCNC: 9 MG/DL (ref 8.8–10.2)
GLUCOSE URINE, POC: NEGATIVE
KETONES, URINE, POC: NEGATIVE
LEUKOCYTE ESTERASE, URINE, POC: NORMAL
NITRITE, URINE, POC: NEGATIVE
PH, URINE, POC: 6 (ref 4.6–8)
PROTEIN,URINE, POC: 30
SPECIFIC GRAVITY, URINE, POC: 1.02 (ref 1–1.03)
URINALYSIS CLARITY, POC: NORMAL
URINALYSIS COLOR, POC: NORMAL
UROBILINOGEN, POC: NORMAL

## 2024-05-15 PROCEDURE — 99024 POSTOP FOLLOW-UP VISIT: CPT | Performed by: OTOLARYNGOLOGY

## 2024-05-15 PROCEDURE — G8427 DOCREV CUR MEDS BY ELIG CLIN: HCPCS | Performed by: UROLOGY

## 2024-05-15 PROCEDURE — 1036F TOBACCO NON-USER: CPT | Performed by: UROLOGY

## 2024-05-15 PROCEDURE — 99214 OFFICE O/P EST MOD 30 MIN: CPT | Performed by: UROLOGY

## 2024-05-15 PROCEDURE — G8417 CALC BMI ABV UP PARAM F/U: HCPCS | Performed by: UROLOGY

## 2024-05-15 PROCEDURE — 3017F COLORECTAL CA SCREEN DOC REV: CPT | Performed by: UROLOGY

## 2024-05-15 PROCEDURE — 1123F ACP DISCUSS/DSCN MKR DOCD: CPT | Performed by: UROLOGY

## 2024-05-15 PROCEDURE — 81003 URINALYSIS AUTO W/O SCOPE: CPT | Performed by: UROLOGY

## 2024-05-15 NOTE — PROGRESS NOTES
[facility]  [address] [place] [address]  [phone]    Vijay Godinez  : 1955     HPI   68 y.o., male returns in follow up for an elevated PSA.  PSA was 4.0 on 2021; 4.2 on 9/15/23; 6.5 on 24; 4.9 (22% free) and now 6.3 on 24.  Taking Cialis 5mg prn for mild ED. Reports nocturia times two.  No prior bx or FH of CaP.  Prior surgery for R UDT as a child.  Recent parathyroidectomy.  No new complaints.         Past Medical History:   Diagnosis Date    Chronic pain of right knee     Essential hypertension     Gastroesophageal reflux disease     History of acute pancreatitis     History of anemia     History of blood transfusion     Iron deficiency anemia     R/t Peptic ulcer. No recurrence since ulcer resolved    Peptic ulcer disease     Primary hyperparathyroidism (HCC)      Past Surgical History:   Procedure Laterality Date    COLONOSCOPY      Dr. Argueta    COLONOSCOPY  2021    Dr. Argueta; 1 polyp; diverticulosis    ORCHIOPEXY Right     3rd grade    PARATHYROID GLAND SURGERY N/A 2024    PARATHYROIDECTOMY performed by Phillip Lam MD at [facility] MAIN OR    PARATHYROIDECTOMY Right 2024    R inferior adenoma- [facility]     Current Outpatient Medications   Medication Sig Dispense Refill    cephALEXin (KEFLEX) 500 MG capsule Take 1 capsule by mouth 4 times daily 28 capsule 0    ondansetron (ZOFRAN-ODT) 4 MG disintegrating tablet Take 1 tablet by mouth 3 times daily as needed for Nausea or Vomiting 10 tablet 0    trolamine salicylate (BLUE-EMU HEMP) 10 % cream Apply topically as needed for Pain Apply topically as needed.      vitamin D (ERGOCALCIFEROL) 1.25 MG (02340 UT) CAPS capsule Take 1 capsule by mouth once a week 12 capsule 1    tadalafil (CIALIS) 5 MG tablet Take 1 tablet by mouth as needed for Erectile Dysfunction 30 tablet 1    amLODIPine-benazepril (LOTREL) 5-10 MG per capsule Take 1 capsule by mouth daily 90 capsule 2     No current

## 2024-05-15 NOTE — PROGRESS NOTES
Vijay Godinez is a 68 y.o. male seen today now 1 wk post-op after undergoing parathyroidectomy back on 5/9/2024.  He had a slightly enlarged right inferior parathyroid gland sitting in a large portion of adipose tissue, and his intraoperative PTH level decreased from 116.0 at baseline down to 24.0, 10 minutes after excision of this likely adenoma.  Doing well since then with minimal incisional pain, and he denies any voice or swallowing complaints.  There have been no symptoms concerning for hypocalcemia.  No other new complaints.    -Neck incision healing well, Steri-Strips removed, no exposed sutures, no hematoma  -Strong voice with no stridor or hoarseness    PATH:  DIAGNOSIS       A:  \" RIGHT INFERIOR NECK MASS\":         ADIPOSE TISSUE IN ASSOCIATION WITH SMALL PARATHYROID GLAND. SEE   COMMENT     A/P:   Diagnosis Orders   1. S/P parathyroidectomy  Calcium        Doing well now 1 week out from his parathyroidectomy.  His pathology was consistent with a small right inferior parathyroid adenoma.  He had excellent normalization of his PTH intraoperatively, and I will check a calcium today and call him with results.  I removed the Steri-Strips and reviewed his wound care instructions.  RTC in 1 month for repeat evaluation    Phillip Lam MD

## 2024-05-18 ENCOUNTER — HOSPITAL ENCOUNTER (OUTPATIENT)
Dept: MRI IMAGING | Age: 69
End: 2024-05-18
Attending: UROLOGY
Payer: MEDICARE

## 2024-05-18 DIAGNOSIS — R97.20 ELEVATED PSA: ICD-10-CM

## 2024-05-18 PROCEDURE — A9579 GAD-BASE MR CONTRAST NOS,1ML: HCPCS | Performed by: UROLOGY

## 2024-05-18 PROCEDURE — 72197 MRI PELVIS W/O & W/DYE: CPT

## 2024-05-18 PROCEDURE — 6360000004 HC RX CONTRAST MEDICATION: Performed by: UROLOGY

## 2024-05-18 PROCEDURE — 2580000003 HC RX 258: Performed by: UROLOGY

## 2024-05-18 RX ORDER — SODIUM CHLORIDE 0.9 % (FLUSH) 0.9 %
10 SYRINGE (ML) INJECTION AS NEEDED
Status: DISCONTINUED | OUTPATIENT
Start: 2024-05-18 | End: 2024-05-22 | Stop reason: HOSPADM

## 2024-05-18 RX ADMIN — SODIUM CHLORIDE, PRESERVATIVE FREE 10 ML: 5 INJECTION INTRAVENOUS at 09:09

## 2024-05-18 RX ADMIN — GADOTERIDOL 24 ML: 279.3 INJECTION, SOLUTION INTRAVENOUS at 09:08

## 2024-05-22 ENCOUNTER — TELEPHONE (OUTPATIENT)
Dept: UROLOGY | Age: 69
End: 2024-05-22

## 2024-05-22 DIAGNOSIS — R97.20 ELEVATED PROSTATE SPECIFIC ANTIGEN (PSA): ICD-10-CM

## 2024-05-22 NOTE — TELEPHONE ENCOUNTER
Procedures: Procedure(s):   PROSTATE BIOPSY FUSION   Date: 6/20/2024   Time: 0900   Location: [place] [address]

## 2024-05-22 NOTE — TELEPHONE ENCOUNTER
----- Message from Johnny Mccabe DO sent at 5/22/2024  8:18 AM EDT -----  I reviewed with pt.  He has elected to proceed with MRI fusion prostate biopsies.  All risks, benefits and alternatives to the above mentioned procedure were discussed and the patient is willing to proceed at this time.  30min  Ua day of  Rocephin 1g IV preop  Outpt  Mac  I sent cipro

## 2024-05-31 NOTE — PERIOP NOTE
Patient verified name and .  Order for consent  found in EHR and matches case posting; patient verifies procedure.       Type 1A surgery, Phone assessment complete.  Orders  received.  Labs per surgeon: UA DOS  Labs per anesthesia protocol: none    Pt voice understanding of Cipro instructions received from Dr Luna office. Pt states he was awaiting written instructions from Dr Luna office. Message sent to [facility] follow up with patient.    Patient answered medical/surgical history questions at their best of ability. All prior to admission medications documented in EPIC.    Patient instructed to continue taking all prescription medications up to the day of surgery but to take only the following medications the day of surgery according to anesthesia guidelines with a small sip of water: none.    Patient informed that all vitamins and supplements should be held 7 days prior to surgery and NSAIDS 5 days prior to surgery. Prescription meds to hold: Cialis- 5 days prior to procedure.    Patient instructed on the following:    > Arrive at Main Entrance, time of arrival to be called the day before by 1700  > NPO after midnight, unless otherwise indicated, including gum, mints, and ice chips  > Responsible adult must drive patient to the hospital, stay during surgery, and patient will need supervision 24 hours after anesthesia  > Use non moisturizing soap in shower the night before surgery and on the morning of surgery  > All piercings must be removed prior to arrival.    > Leave all valuables (money and jewelry) at home but bring insurance card and ID on DOS.   > You may be required to pay a deductible or co-pay on the day of your procedure. You can pre-pay by calling [phone] if your surgery is at the [facility] or [phone] if your surgery is at the [facility].  > Do not wear make-up, nail polish, lotions, cologne, perfumes, powders, or oil on skin. Artificial nails are not permitted.

## 2024-06-04 NOTE — PERIOP NOTE
Received call from patient with f/u questions concerning medications and enema. Patient asking about Lotrel. Per Anesthesia protocol, hold only if General anesthesia. Case is posted with MAC, patient instructed to take DOS. No documentation found in EHR for enema. Patient advised to call Sunshine Mayers for instructions. Patient verbalized understanding    Allergy;

## 2024-06-09 ENCOUNTER — ANESTHESIA EVENT (OUTPATIENT)
Dept: SURGERY | Age: 69
End: 2024-06-09
Payer: MEDICARE

## 2024-06-10 ENCOUNTER — HOSPITAL ENCOUNTER (OUTPATIENT)
Age: 69
Setting detail: OUTPATIENT SURGERY
Discharge: HOME | End: 2024-06-10
Attending: UROLOGY | Admitting: UROLOGY
Payer: MEDICARE

## 2024-06-10 ENCOUNTER — ANESTHESIA (OUTPATIENT)
Dept: SURGERY | Age: 69
End: 2024-06-10
Payer: MEDICARE

## 2024-06-10 VITALS
DIASTOLIC BLOOD PRESSURE: 84 MMHG | RESPIRATION RATE: 16 BRPM | WEIGHT: 263.6 LBS | BODY MASS INDEX: 34.94 KG/M2 | OXYGEN SATURATION: 97 % | HEIGHT: 73 IN | SYSTOLIC BLOOD PRESSURE: 149 MMHG | HEART RATE: 74 BPM | TEMPERATURE: 98 F

## 2024-06-10 LAB
APPEARANCE UR: CLEAR
BACTERIA URNS QL MICRO: 0 /HPF
BILIRUB UR QL: NEGATIVE
COLOR UR: ABNORMAL
EPI CELLS #/AREA URNS HPF: ABNORMAL /HPF
GLUCOSE UR STRIP.AUTO-MCNC: NEGATIVE MG/DL
HGB UR QL STRIP: ABNORMAL
KETONES UR QL STRIP.AUTO: NEGATIVE MG/DL
LEUKOCYTE ESTERASE UR QL STRIP.AUTO: ABNORMAL
MUCOUS THREADS URNS QL MICRO: ABNORMAL /LPF
NITRITE UR QL STRIP.AUTO: NEGATIVE
OTHER OBSERVATIONS: ABNORMAL
PH UR STRIP: 5 (ref 5–9)
PROT UR STRIP-MCNC: ABNORMAL MG/DL
RBC #/AREA URNS HPF: ABNORMAL /HPF
SP GR UR REFRACTOMETRY: 1.02 (ref 1–1.02)
UROBILINOGEN UR QL STRIP.AUTO: 0.2 EU/DL (ref 0.2–1)
WBC URNS QL MICRO: ABNORMAL /HPF

## 2024-06-10 PROCEDURE — 3600000002 HC SURGERY LEVEL 2 BASE: Performed by: UROLOGY

## 2024-06-10 PROCEDURE — 3600000012 HC SURGERY LEVEL 2 ADDTL 15MIN: Performed by: UROLOGY

## 2024-06-10 PROCEDURE — 81001 URINALYSIS AUTO W/SCOPE: CPT

## 2024-06-10 PROCEDURE — 88305 TISSUE EXAM BY PATHOLOGIST: CPT

## 2024-06-10 PROCEDURE — 3700000000 HC ANESTHESIA ATTENDED CARE: Performed by: UROLOGY

## 2024-06-10 PROCEDURE — 7100000011 HC PHASE II RECOVERY - ADDTL 15 MIN: Performed by: UROLOGY

## 2024-06-10 PROCEDURE — 2709999900 HC NON-CHARGEABLE SUPPLY: Performed by: UROLOGY

## 2024-06-10 PROCEDURE — 6370000000 HC RX 637 (ALT 250 FOR IP): Performed by: ANESTHESIOLOGY

## 2024-06-10 PROCEDURE — 6360000002 HC RX W HCPCS: Performed by: UROLOGY

## 2024-06-10 PROCEDURE — 2580000003 HC RX 258

## 2024-06-10 PROCEDURE — 76872 US TRANSRECTAL: CPT | Performed by: UROLOGY

## 2024-06-10 PROCEDURE — 7100000010 HC PHASE II RECOVERY - FIRST 15 MIN: Performed by: UROLOGY

## 2024-06-10 PROCEDURE — 55700 PR PROSTATE NEEDLE BIOPSY ANY APPROACH: CPT | Performed by: UROLOGY

## 2024-06-10 PROCEDURE — 3700000001 HC ADD 15 MINUTES (ANESTHESIA): Performed by: UROLOGY

## 2024-06-10 PROCEDURE — 7100000000 HC PACU RECOVERY - FIRST 15 MIN: Performed by: UROLOGY

## 2024-06-10 PROCEDURE — 2580000003 HC RX 258: Performed by: ANESTHESIOLOGY

## 2024-06-10 PROCEDURE — 2500000003 HC RX 250 WO HCPCS

## 2024-06-10 PROCEDURE — 6360000002 HC RX W HCPCS

## 2024-06-10 PROCEDURE — 2580000003 HC RX 258: Performed by: UROLOGY

## 2024-06-10 RX ORDER — HYDROMORPHONE HYDROCHLORIDE 2 MG/ML
0.5 INJECTION, SOLUTION INTRAMUSCULAR; INTRAVENOUS; SUBCUTANEOUS EVERY 5 MIN PRN
Status: DISCONTINUED | OUTPATIENT
Start: 2024-06-10 | End: 2024-06-10 | Stop reason: HOSPADM

## 2024-06-10 RX ORDER — LIDOCAINE HYDROCHLORIDE 10 MG/ML
1 INJECTION, SOLUTION INFILTRATION; PERINEURAL
Status: DISCONTINUED | OUTPATIENT
Start: 2024-06-10 | End: 2024-06-10 | Stop reason: HOSPADM

## 2024-06-10 RX ORDER — NALOXONE HYDROCHLORIDE 0.4 MG/ML
INJECTION, SOLUTION INTRAMUSCULAR; INTRAVENOUS; SUBCUTANEOUS PRN
Status: DISCONTINUED | OUTPATIENT
Start: 2024-06-10 | End: 2024-06-10 | Stop reason: HOSPADM

## 2024-06-10 RX ORDER — PHENYLEPHRINE HYDROCHLORIDE 10 MG/ML
INJECTION INTRAVENOUS PRN
Status: DISCONTINUED | OUTPATIENT
Start: 2024-06-10 | End: 2024-06-10 | Stop reason: SDUPTHER

## 2024-06-10 RX ORDER — ACETAMINOPHEN 500 MG
1000 TABLET ORAL ONCE
Status: COMPLETED | OUTPATIENT
Start: 2024-06-10 | End: 2024-06-10

## 2024-06-10 RX ORDER — OXYCODONE HYDROCHLORIDE 5 MG/1
5 TABLET ORAL PRN
Status: DISCONTINUED | OUTPATIENT
Start: 2024-06-10 | End: 2024-06-10 | Stop reason: HOSPADM

## 2024-06-10 RX ORDER — SODIUM CHLORIDE 0.9 % (FLUSH) 0.9 %
5-40 SYRINGE (ML) INJECTION PRN
Status: DISCONTINUED | OUTPATIENT
Start: 2024-06-10 | End: 2024-06-10 | Stop reason: HOSPADM

## 2024-06-10 RX ORDER — SODIUM CHLORIDE 0.9 % (FLUSH) 0.9 %
5-40 SYRINGE (ML) INJECTION EVERY 12 HOURS SCHEDULED
Status: DISCONTINUED | OUTPATIENT
Start: 2024-06-10 | End: 2024-06-10 | Stop reason: HOSPADM

## 2024-06-10 RX ORDER — PROPOFOL 10 MG/ML
INJECTION, EMULSION INTRAVENOUS PRN
Status: DISCONTINUED | OUTPATIENT
Start: 2024-06-10 | End: 2024-06-10 | Stop reason: SDUPTHER

## 2024-06-10 RX ORDER — PROCHLORPERAZINE EDISYLATE 5 MG/ML
5 INJECTION INTRAMUSCULAR; INTRAVENOUS
Status: DISCONTINUED | OUTPATIENT
Start: 2024-06-10 | End: 2024-06-10 | Stop reason: HOSPADM

## 2024-06-10 RX ORDER — SODIUM CHLORIDE 9 MG/ML
INJECTION, SOLUTION INTRAVENOUS PRN
Status: DISCONTINUED | OUTPATIENT
Start: 2024-06-10 | End: 2024-06-10 | Stop reason: HOSPADM

## 2024-06-10 RX ORDER — HYDROMORPHONE HYDROCHLORIDE 2 MG/ML
0.25 INJECTION, SOLUTION INTRAMUSCULAR; INTRAVENOUS; SUBCUTANEOUS EVERY 5 MIN PRN
Status: DISCONTINUED | OUTPATIENT
Start: 2024-06-10 | End: 2024-06-10 | Stop reason: HOSPADM

## 2024-06-10 RX ORDER — LIDOCAINE HYDROCHLORIDE 20 MG/ML
INJECTION, SOLUTION EPIDURAL; INFILTRATION; INTRACAUDAL; PERINEURAL PRN
Status: DISCONTINUED | OUTPATIENT
Start: 2024-06-10 | End: 2024-06-10 | Stop reason: SDUPTHER

## 2024-06-10 RX ORDER — OXYCODONE HYDROCHLORIDE 5 MG/1
10 TABLET ORAL PRN
Status: DISCONTINUED | OUTPATIENT
Start: 2024-06-10 | End: 2024-06-10 | Stop reason: HOSPADM

## 2024-06-10 RX ORDER — ONDANSETRON 2 MG/ML
4 INJECTION INTRAMUSCULAR; INTRAVENOUS
Status: DISCONTINUED | OUTPATIENT
Start: 2024-06-10 | End: 2024-06-10 | Stop reason: HOSPADM

## 2024-06-10 RX ORDER — SODIUM CHLORIDE, SODIUM LACTATE, POTASSIUM CHLORIDE, CALCIUM CHLORIDE 600; 310; 30; 20 MG/100ML; MG/100ML; MG/100ML; MG/100ML
INJECTION, SOLUTION INTRAVENOUS CONTINUOUS
Status: DISCONTINUED | OUTPATIENT
Start: 2024-06-10 | End: 2024-06-10 | Stop reason: HOSPADM

## 2024-06-10 RX ORDER — MIDAZOLAM HYDROCHLORIDE 2 MG/2ML
2 INJECTION, SOLUTION INTRAMUSCULAR; INTRAVENOUS
Status: DISCONTINUED | OUTPATIENT
Start: 2024-06-10 | End: 2024-06-10 | Stop reason: HOSPADM

## 2024-06-10 RX ADMIN — ACETAMINOPHEN 1000 MG: 500 TABLET, FILM COATED ORAL at 09:16

## 2024-06-10 RX ADMIN — PROPOFOL 125 MCG/KG/MIN: 10 INJECTION, EMULSION INTRAVENOUS at 09:41

## 2024-06-10 RX ADMIN — LIDOCAINE HYDROCHLORIDE 50 MG: 20 INJECTION, SOLUTION EPIDURAL; INFILTRATION; INTRACAUDAL; PERINEURAL at 09:39

## 2024-06-10 RX ADMIN — CEFTRIAXONE 1000 MG: 1 INJECTION, POWDER, FOR SOLUTION INTRAMUSCULAR; INTRAVENOUS at 09:44

## 2024-06-10 RX ADMIN — SODIUM CHLORIDE, POTASSIUM CHLORIDE, SODIUM LACTATE AND CALCIUM CHLORIDE: 600; 310; 30; 20 INJECTION, SOLUTION INTRAVENOUS at 09:16

## 2024-06-10 RX ADMIN — PHENYLEPHRINE HYDROCHLORIDE 150 MCG: 10 INJECTION INTRAVENOUS at 09:59

## 2024-06-10 RX ADMIN — CEFTRIAXONE 1000 MG: 1 INJECTION, POWDER, FOR SOLUTION INTRAMUSCULAR; INTRAVENOUS at 09:17

## 2024-06-10 RX ADMIN — PROPOFOL 50 MG: 10 INJECTION, EMULSION INTRAVENOUS at 09:39

## 2024-06-10 RX ADMIN — PROPOFOL 30 MG: 10 INJECTION, EMULSION INTRAVENOUS at 09:40

## 2024-06-10 ASSESSMENT — PAIN - FUNCTIONAL ASSESSMENT: PAIN_FUNCTIONAL_ASSESSMENT: 0-10

## 2024-06-10 NOTE — H&P
Thyroid Disease Father         nodules    Hypertension Father     Melanoma Father     Atrial Fibrillation Father     Breast Cancer Sister     Stroke Paternal Grandfather        Review of Systems  All systems reviewed and are negative at this time.    Physical Exam  Ht 1.88 m (6' 2\")   Wt 119.3 kg (263 lb)   BMI 33.77 kg/m²   General appearance - alert, well appearing, and in no distress  Mental status - alert and oriented  Eyes - extraocular eye movements intact, sclera anicteric  Nose - normal and patent, no erythema or discharge  Mouth - mucous membranes moist  Chest - clear to auscultation bilaterally  Heart - normal rate, regular rhythm  Abdomen - soft, nontender, nondistended, no masses or organomegaly  Neurological - normal speech, no focal findings or movement disorder noted  Skin - normal coloration and turgor      Assessment/Plan  Elevated PSA.  He has elected to proceed with MRI fusion biopsies of the prostate.  All risks, benefits and alternatives to the above mentioned procedure were discussed and the patient is willing to proceed at this time.      NGUYEN CERVANTES, DO

## 2024-06-10 NOTE — ANESTHESIA PRE PROCEDURE
Department of Anesthesiology  Preprocedure Note       Name:  Vijay Godinez   Age:  68 y.o.  :  1955                                          MRN:  [id number]         Date:  6/10/2024      Surgeon: Surgeon(s):  Johnny Mccabe DO    Procedure: Procedure(s):  PROSTATE BIOPSY FUSION    Medications prior to admission:   Prior to Admission medications    Medication Sig Start Date End Date Taking? Authorizing Provider   trolamine salicylate (BLUE-EMU HEMP) 10 % cream Apply topically as needed for Pain Apply topically as needed.    Provider, MD Gerda   vitamin D (ERGOCALCIFEROL) 1.25 MG (59412 UT) CAPS capsule Take 1 capsule by mouth once a week  Patient taking differently: Take 1 capsule by mouth once a week Every 24   Isak Vale MD   tadalafil (CIALIS) 5 MG tablet Take 1 tablet by mouth as needed for Erectile Dysfunction 24   Nomi Collins MD   amLODIPine-benazepril (LOTREL) 5-10 MG per capsule Take 1 capsule by mouth daily 24   Nomi Collins MD       Current medications:    Current Facility-Administered Medications   Medication Dose Route Frequency Provider Last Rate Last Admin   • lidocaine 1 % injection 1 mL  1 mL IntraDERmal Once PRN VINOD Romero MD       • acetaminophen (TYLENOL) tablet 1,000 mg  1,000 mg Oral Once VINOD Romero MD       • lactated ringers IV soln infusion   IntraVENous Continuous VINOD Romero MD       • sodium chloride flush 0.9 % injection 5-40 mL  5-40 mL IntraVENous 2 times per day VINOD Romero MD       • sodium chloride flush 0.9 % injection 5-40 mL  5-40 mL IntraVENous PRN VINOD Romero MD       • 0.9 % sodium chloride infusion   IntraVENous PRN VINOD Romero MD       • midazolam PF (VERSED) injection 2 mg  2 mg IntraVENous Once PRN VINOD Romero MD       • cefTRIAXone (ROCEPHIN) 1,000 mg in sterile water 10 mL IV syringe  1,000 mg IntraVENous On Call to OR Johnny Mccabe DO

## 2024-06-10 NOTE — BRIEF OP NOTE
Brief Postoperative Note      Patient: Vijay Godinez  YOB: 1955  MRN: [id number]    Date of Procedure: 6/10/2024    Pre-Op Diagnosis Codes:     * Elevated prostate specific antigen (PSA) [R97.20]    Post-Op Diagnosis: Same       Procedure(s):  PROSTATE BIOPSY FUSION    Surgeon(s):  Nguyen Cervantes, DO    Assistant:  * No surgical staff found *    Anesthesia: Monitor Anesthesia Care    Estimated Blood Loss (mL): <5cc    Complications: none immediate    Specimens:   ID Type Source Tests Collected by Time Destination   A : LB Tissue Prostate SURGICAL PATHOLOGY [place], Nguyen P, DO 6/10/2024 0945    B : LM Tissue Prostate SURGICAL PATHOLOGY [place], Nguyen P, DO 6/10/2024 0929    C : LA Tissue Prostate SURGICAL PATHOLOGY [place], Nguyen P, DO 6/10/2024 0929    D : LLB Tissue Prostate SURGICAL PATHOLOGY [place], Nguyen P, DO 6/10/2024 0929    E : LLM Tissue Prostate SURGICAL PATHOLOGY [place], Nguyen P, DO 6/10/2024 0929    F : LLA Tissue Prostate SURGICAL PATHOLOGY [place], Nguyen P, DO 6/10/2024 0929    G : RB Tissue Prostate SURGICAL PATHOLOGY [place], Nguyen P, DO 6/10/2024 0929    H : RM Tissue Prostate SURGICAL PATHOLOGY [place], Nguyen P, DO 6/10/2024 0929    I : RA Tissue Prostate SURGICAL PATHOLOGY [place], Nguyen P, DO 6/10/2024 0929    J : RLB Tissue Prostate SURGICAL PATHOLOGY [place], Nguyen P, DO 6/10/2024 0929    K : RLM Tissue Prostate SURGICAL PATHOLOGY [place], Nguyen P, DO 6/10/2024 0929    L : RLA Tissue Prostate SURGICAL PATHOLOGY [place], Nguyen P, DO 6/10/2024 0929    M : JONAH #1 Tissue Prostate SURGICAL PATHOLOGY [place], Nguyen P, DO 6/10/2024 0940    N : JONAH #2 Tissue Prostate SURGICAL PATHOLOGY [place], Nguyen P, DO 6/10/2024 0940    O : JONAH #3 Tissue Prostate SURGICAL PATHOLOGY [place], Nguyen P, DO 6/10/2024 0940        Implants:  * No implants in log *      Drains: * No LDAs found *        Electronically signed by NGUYEN CERVANTES DO on 6/10/2024 at

## 2024-06-10 NOTE — OP NOTE
[address]  [address]                            OPERATIVE REPORT      PATIENT NAME: JODI SEGOVIA         : 1955  MED REC NO: [id number]                       ROOM: [facility] NO: [id number]                       ADMIT DATE: 06/10/2024  PROVIDER: Johnny Mccabe DO    DATE OF SERVICE:  06/10/2024    PREOPERATIVE DIAGNOSES:  Elevated PSA.    POSTOPERATIVE DIAGNOSES:  Elevated PSA.    PROCEDURES PERFORMED:  MRI fusion biopsies of the prostate.    SURGEON:  Johnny Mccabe DO    ASSISTANT:  None.    ANESTHESIA:  MAC.    ESTIMATED BLOOD LOSS:  Less than 5 mL.    SPECIMENS REMOVED:  Prostate biopsies.    INTRAOPERATIVE FINDINGS:  Please see dictated operative note.     COMPLICATIONS:  None immediate.    IMPLANTS:  None.    INDICATIONS:  This is a 68-year-old gentleman, recently found to have an elevated PSA of 6.3 on 2024.  An MRI on 2024 showed 2 PI-RADS 4 lesions of the right apex and 1 PI-RADS 5 lesion at the left mid-gland.  All risks, benefits, and alternatives to the above-mentioned procedure have been reviewed and he is willing to proceed at this time.    DESCRIPTION OF PROCEDURE:  The patient's consent was obtained, and the patient was brought back to the operating room, at which time he was placed in the modified right lateral decubitus position.  All pressure points were padded, and the patient was secured to the table.  After the uneventful induction of MAC anesthesia, a digital rectal examination was performed.  This revealed an enlarged, but anodular prostate.  A transrectal ultrasound probe was then inserted into the rectum and the prostate was surveyed.  The prostate was mildly heterogeneous in nature.  No discrete lesions were seen.  Volumetric measurements were obtained.  Calculated prostate volume was 158 mL.  Using the UroNav software, the previously marked MRI images were fused to the

## 2024-06-10 NOTE — ANESTHESIA POSTPROCEDURE EVALUATION
Department of Anesthesiology  Postprocedure Note    Patient: Vijay Godinez  MRN: [id number]  YOB: 1955  Date of evaluation: 6/10/2024    Procedure Summary       Date: 06/10/24 Room / Location: [facility] MAIN OR  / [facility] MAIN OR    Anesthesia Start: 0929 Anesthesia Stop: 1012    Procedure: PROSTATE BIOPSY FUSION (Perineum) Diagnosis:       Elevated prostate specific antigen (PSA)      (Elevated prostate specific antigen (PSA) [R97.20])    Providers: Johnny Mccabe DO Responsible Provider: VINOD Romero MD    Anesthesia Type: TIVA ASA Status: 2            Anesthesia Type: TIVA    Olivia Phase I: Olivia Score: 8    Olivia Phase II:      Anesthesia Post Evaluation    Patient location during evaluation: PACU  Patient participation: complete - patient participated  Level of consciousness: awake and alert  Airway patency: patent  Nausea & Vomiting: no nausea and no vomiting  Cardiovascular status: hemodynamically stable  Respiratory status: acceptable  Hydration status: euvolemic  Comments: Blood pressure (!) 143/82, pulse 69, temperature 97.7 °F (36.5 °C), temperature source Tympanic, resp. rate 14, height 1.854 m (6' 1\"), weight 119.6 kg (263 lb 9.6 oz), SpO2 95 %.    No apparent anesthetic complications.  Pt stable for discharge from PACU  Multimodal analgesia pain management approach  Pain management: adequate    No notable events documented.

## 2024-06-10 NOTE — DISCHARGE INSTRUCTIONS
prescription Narcotics:  Limit your activities  A responsible adult needs to be with you for the next 24 hours  Do not drive and operate hazardous machinery  Do not make important personal or business decisions  Do not drink alcoholic beverages  If you have not urinated within 8 hours after discharge, and you are experiencing discomfort from urinary retention, please go to the nearest ED.  If you have sleep apnea and have a CPAP machine, please use it for all naps and sleeping.  Please use caution when taking narcotics and any of your home medications that may cause drowsiness.  *  Please give a list of your current medications to your Primary Care Provider.  *  Please update this list whenever your medications are discontinued, doses are      changed, or new medications (including over-the-counter products) are added.  *  Please carry medication information at all times in case of emergency situations.    These are general instructions for a healthy lifestyle:  No smoking/ No tobacco products/ Avoid exposure to second hand smoke  Surgeon General's Warning:  Quitting smoking now greatly reduces serious risk to your health.  Obesity, smoking, and sedentary lifestyle greatly increases your risk for illness  A healthy diet, regular physical exercise & weight monitoring are important for maintaining a healthy lifestyle    You may be retaining fluid if you have a history of heart failure or if you experience any of the following symptoms:  Weight gain of 3 pounds or more overnight or 5 pounds in a week, increased swelling in our hands or feet or shortness of breath while lying flat in bed.  Please call your doctor as soon as you notice any of these symptoms; do not wait until your next office visit.

## 2024-06-12 ENCOUNTER — TELEPHONE (OUTPATIENT)
Dept: UROLOGY | Age: 69
End: 2024-06-12

## 2024-06-12 NOTE — TELEPHONE ENCOUNTER
He called back and said that he was unable to answer your call but he ask if you could call him tomorrow between 8-9 to talk about his results.

## 2024-06-13 ENCOUNTER — TELEPHONE (OUTPATIENT)
Dept: UROLOGY | Age: 69
End: 2024-06-13

## 2024-06-13 NOTE — TELEPHONE ENCOUNTER
Patient called back and said he missed a call from you. He wanted to know if you would please call him back today if you have time so you can talk about the results.

## 2024-06-17 ENCOUNTER — OFFICE VISIT (OUTPATIENT)
Dept: ENT CLINIC | Age: 69
End: 2024-06-17

## 2024-06-17 VITALS — BODY MASS INDEX: 34.95 KG/M2 | WEIGHT: 263.67 LBS | HEIGHT: 73 IN

## 2024-06-17 DIAGNOSIS — Z98.890 S/P PARATHYROIDECTOMY: Primary | ICD-10-CM

## 2024-06-17 DIAGNOSIS — Z90.89 S/P PARATHYROIDECTOMY: Primary | ICD-10-CM

## 2024-06-17 PROCEDURE — 99024 POSTOP FOLLOW-UP VISIT: CPT | Performed by: OTOLARYNGOLOGY

## 2024-06-17 NOTE — PROGRESS NOTES
Vijay Godinez is a 68 y.o. male seen today now 1 month postop after undergoing parathyroidectomy back on 5/9/2024.  He had a slightly enlarged right inferior parathyroid gland sitting in a large portion of adipose tissue, and his intraoperative PTH level decreased from 116.0 at baseline down to 24.0 after excision.  Last seen back on 5/15/2024.  Doing well since then with minimal incisional pain, although he still feels some limitation in moving his neck side-to-side.  He has been dealing with increased throat mucus still since his surgery, but denies any swallowing complaints.  He underwent recent prostate biopsy with Dr. Mccabe, and unfortunately there was evidence of adenocarcinoma.  He has an appointment in a couple weeks to discuss his options.  He still reports chronic fatigue since his surgery as well as some lower extremity bone pain.    Ht 1.854 m (6' 0.99\")   Wt 119.6 kg (263 lb 10.7 oz)   BMI 34.79 kg/m²    -Neck incision well-healed, no further edema, no palpable underlying hematoma or fluid collection  -Voice is strong with no stridor or hoarseness    Lab Results   Component Value Date    CALCIUM 9.0 05/15/2024    PHOS 2.7 01/23/2024         A/P:   Diagnosis Orders   1. S/P parathyroidectomy          His incision looks great now 1 month out from his parathyroidectomy.  His calcium has normalized- most recently 9.0- which is great news.  I suspect that his bone pain should continue to improve with time.  I discussed with him that chronic fatigue is not the main indication for parathyroid surgery, and I am concerned about possible underlying PARKER based on his neck/throat anatomy.  They will discuss proceeding with a PSG and call back if they decide to proceed.    Phillip Lam MD

## 2024-06-21 DIAGNOSIS — R06.83 SNORING: Primary | ICD-10-CM

## 2024-07-02 ENCOUNTER — OFFICE VISIT (OUTPATIENT)
Dept: UROLOGY | Age: 69
End: 2024-07-02
Payer: MEDICARE

## 2024-07-02 DIAGNOSIS — C61 PROSTATE CANCER (HCC): Primary | ICD-10-CM

## 2024-07-02 PROCEDURE — 1123F ACP DISCUSS/DSCN MKR DOCD: CPT | Performed by: UROLOGY

## 2024-07-02 PROCEDURE — 99214 OFFICE O/P EST MOD 30 MIN: CPT | Performed by: UROLOGY

## 2024-07-02 PROCEDURE — 1036F TOBACCO NON-USER: CPT | Performed by: UROLOGY

## 2024-07-02 PROCEDURE — 3017F COLORECTAL CA SCREEN DOC REV: CPT | Performed by: UROLOGY

## 2024-07-02 PROCEDURE — G8427 DOCREV CUR MEDS BY ELIG CLIN: HCPCS | Performed by: UROLOGY

## 2024-07-02 PROCEDURE — G8417 CALC BMI ABV UP PARAM F/U: HCPCS | Performed by: UROLOGY

## 2024-07-02 NOTE — PROGRESS NOTES
[facility]  [address] [place] [address]  [phone]    Vijay Godinez  : 1955     HPI   68 y.o., male returns in follow up for CaP.  PSA was up to 6.3 on 24. MRI on 24 shows two pirads 4 R lesions and a pirads 5 LM lesion.  Fusion biopsies on 6/10/24 showed Cj 6 in three cores (RB, RLB, RLM).  Vol was 158g.  Prior R orchiopexy for a R UDT.        Past Medical History:   Diagnosis Date    Chronic pain of right knee     Elevated prostate specific antigen (PSA)     prostate Biopsy scheduled on 6.10.24    Essential hypertension     Gastroesophageal reflux disease     History of acute pancreatitis     History of anemia     with hx of bleeding ulcer in - no other issues of anemia    History of blood transfusion     due to bleeding ulcer    Iron deficiency anemia     R/t Peptic ulcer. No recurrence since ulcer resolved    Peptic ulcer disease     Primary hyperparathyroidism (HCC)     resulted in parathyroidectomy     Past Surgical History:   Procedure Laterality Date    COLONOSCOPY      Dr. Argueta    COLONOSCOPY  2021    Dr. Argueta; 1 polyp; diverticulosis    ORCHIOPEXY Right     3rd grade    PARATHYROID GLAND SURGERY N/A 2024    PARATHYROIDECTOMY performed by Phillip Lam MD at [facility] MAIN OR    PARATHYROIDECTOMY Right 2024    R inferior adenoma- [facility]    PROSTATE BIOPSY N/A 6/10/2024    PROSTATE BIOPSY FUSION performed by Johnny Mccabe DO at [facility] MAIN OR     Current Outpatient Medications   Medication Sig Dispense Refill    trolamine salicylate (BLUE-EMU HEMP) 10 % cream Apply topically as needed for Pain Apply topically as needed.      vitamin D (ERGOCALCIFEROL) 1.25 MG (49679 UT) CAPS capsule Take 1 capsule by mouth once a week (Patient taking differently: Take 1 capsule by mouth once a week Every Monday) 12 capsule 1    tadalafil (CIALIS) 5 MG tablet Take 1 tablet by mouth as needed for Erectile Dysfunction 30 tablet 1

## 2024-07-11 ENCOUNTER — OFFICE VISIT (OUTPATIENT)
Dept: FAMILY MEDICINE CLINIC | Facility: CLINIC | Age: 69
End: 2024-07-11

## 2024-07-11 VITALS
HEIGHT: 73 IN | OXYGEN SATURATION: 97 % | HEART RATE: 91 BPM | SYSTOLIC BLOOD PRESSURE: 120 MMHG | TEMPERATURE: 98.1 F | BODY MASS INDEX: 35.26 KG/M2 | DIASTOLIC BLOOD PRESSURE: 82 MMHG | WEIGHT: 266.06 LBS

## 2024-07-11 DIAGNOSIS — E66.01 CLASS 2 SEVERE OBESITY DUE TO EXCESS CALORIES WITH SERIOUS COMORBIDITY AND BODY MASS INDEX (BMI) OF 35.0 TO 35.9 IN ADULT (HCC): ICD-10-CM

## 2024-07-11 DIAGNOSIS — M25.561 CHRONIC PAIN OF RIGHT KNEE: Primary | ICD-10-CM

## 2024-07-11 DIAGNOSIS — C61 PROSTATE CANCER (HCC): ICD-10-CM

## 2024-07-11 DIAGNOSIS — N40.1 BENIGN PROSTATIC HYPERPLASIA WITH NOCTURIA: ICD-10-CM

## 2024-07-11 DIAGNOSIS — N52.9 ERECTILE DYSFUNCTION, UNSPECIFIED ERECTILE DYSFUNCTION TYPE: ICD-10-CM

## 2024-07-11 DIAGNOSIS — I10 ESSENTIAL (PRIMARY) HYPERTENSION: ICD-10-CM

## 2024-07-11 DIAGNOSIS — R06.83 SNORING: ICD-10-CM

## 2024-07-11 DIAGNOSIS — Z86.2 HISTORY OF ANEMIA: ICD-10-CM

## 2024-07-11 DIAGNOSIS — R35.1 BENIGN PROSTATIC HYPERPLASIA WITH NOCTURIA: ICD-10-CM

## 2024-07-11 DIAGNOSIS — G89.29 CHRONIC PAIN OF RIGHT KNEE: Primary | ICD-10-CM

## 2024-07-11 DIAGNOSIS — M81.0 OSTEOPOROSIS WITHOUT CURRENT PATHOLOGICAL FRACTURE, UNSPECIFIED OSTEOPOROSIS TYPE: ICD-10-CM

## 2024-07-11 LAB
BASOPHILS # BLD: 0.1 K/UL (ref 0–0.2)
BASOPHILS NFR BLD: 1 % (ref 0–2)
DIFFERENTIAL METHOD BLD: NORMAL
EOSINOPHIL # BLD: 0.3 K/UL (ref 0–0.8)
EOSINOPHIL NFR BLD: 5 % (ref 0.5–7.8)
ERYTHROCYTE [DISTWIDTH] IN BLOOD BY AUTOMATED COUNT: 12.2 % (ref 11.9–14.6)
HCT VFR BLD AUTO: 49.1 % (ref 41.1–50.3)
HGB BLD-MCNC: 15.4 G/DL (ref 13.6–17.2)
IMM GRANULOCYTES # BLD AUTO: 0.1 K/UL (ref 0–0.5)
IMM GRANULOCYTES NFR BLD AUTO: 1 % (ref 0–5)
IRON SERPL-MCNC: 63 UG/DL (ref 35–100)
LYMPHOCYTES # BLD: 1.3 K/UL (ref 0.5–4.6)
LYMPHOCYTES NFR BLD: 20 % (ref 13–44)
MCH RBC QN AUTO: 28.9 PG (ref 26.1–32.9)
MCHC RBC AUTO-ENTMCNC: 31.4 G/DL (ref 31.4–35)
MCV RBC AUTO: 92.3 FL (ref 82–102)
MONOCYTES # BLD: 0.5 K/UL (ref 0.1–1.3)
MONOCYTES NFR BLD: 8 % (ref 4–12)
NEUTS SEG # BLD: 4.2 K/UL (ref 1.7–8.2)
NEUTS SEG NFR BLD: 65 % (ref 43–78)
NRBC # BLD: 0 K/UL (ref 0–0.2)
PLATELET # BLD AUTO: 240 K/UL (ref 150–450)
PMV BLD AUTO: 9.9 FL (ref 9.4–12.3)
PSA SERPL-MCNC: 5.7 NG/ML (ref 0–4)
RBC # BLD AUTO: 5.32 M/UL (ref 4.23–5.6)
WBC # BLD AUTO: 6.5 K/UL (ref 4.3–11.1)

## 2024-07-11 RX ORDER — AMLODIPINE BESYLATE AND BENAZEPRIL HYDROCHLORIDE 5; 10 MG/1; MG/1
1 CAPSULE ORAL DAILY
Qty: 90 CAPSULE | Refills: 2 | Status: SHIPPED | OUTPATIENT
Start: 2024-07-11

## 2024-07-11 RX ORDER — TRIAMCINOLONE ACETONIDE 40 MG/ML
40 INJECTION, SUSPENSION INTRA-ARTICULAR; INTRAMUSCULAR ONCE
Status: COMPLETED | OUTPATIENT
Start: 2024-07-11 | End: 2024-07-11

## 2024-07-11 RX ORDER — TADALAFIL 5 MG/1
5 TABLET ORAL PRN
Qty: 90 TABLET | Refills: 2 | Status: SHIPPED | OUTPATIENT
Start: 2024-07-11

## 2024-07-11 RX ADMIN — TRIAMCINOLONE ACETONIDE 40 MG: 40 INJECTION, SUSPENSION INTRA-ARTICULAR; INTRAMUSCULAR at 10:46

## 2024-07-11 ASSESSMENT — PATIENT HEALTH QUESTIONNAIRE - PHQ9
SUM OF ALL RESPONSES TO PHQ QUESTIONS 1-9: 0
SUM OF ALL RESPONSES TO PHQ QUESTIONS 1-9: 0
1. LITTLE INTEREST OR PLEASURE IN DOING THINGS: NOT AT ALL
SUM OF ALL RESPONSES TO PHQ9 QUESTIONS 1 & 2: 0
SUM OF ALL RESPONSES TO PHQ QUESTIONS 1-9: 0
2. FEELING DOWN, DEPRESSED OR HOPELESS: NOT AT ALL
SUM OF ALL RESPONSES TO PHQ QUESTIONS 1-9: 0

## 2024-07-11 NOTE — PROGRESS NOTES
(HCC)          Patient tolerated procedure as above.  May not receive benefit from this injection for several days.  Patient now has received knee injection approximately every 6 or more months on several occasions.  Did refer patient to Dr. Rob Galeana, orthopedist, for further evaluation.  Patient to monitor blood pressure make sure staying less than 140/90.  Reviewed with patient his diagnosis of prostate cancer and his visits with Dr. Mccabe including proceeding with vigilant follow-ups and awaiting report in regards to seeking out rest of the cancer might possibly be with current Cj scores on the lower side.  Refilled his Lotrel.  Also refilled his Cialis which was recommended as 5 mg daily for 10 mg every other day or 20 mg every few days.  For consistency, have sent in the 5 mg daily to help with ED and BPH.  Reviewed with patient his evaluation by endocrinologist Dr. Vale as well as parathyroid surgical excision per ENT Dr. Lam as well as his diagnosis of osteoporosis and vitamin D deficiency being treated by Dr. Lam.  Follow-up as planned later this month with him.  Await lab work as above.  Patient has had referral sent in on June 21 for sleep studies secondary to his excessive snoring.  Encouraged healthy activity as able.  Plan reassessment here in 6 months fasting and for subsequent wellness exam.      Provided educational information on the following:      Discharge Meds:  Current Outpatient Medications   Medication Sig Dispense Refill    amLODIPine-benazepril (LOTREL) 5-10 MG per capsule Take 1 capsule by mouth daily 90 capsule 2    tadalafil (CIALIS) 5 MG tablet Take 1 tablet by mouth as needed for Erectile Dysfunction 90 tablet 2    trolamine salicylate (BLUE-EMU HEMP) 10 % cream Apply topically as needed for Pain Apply topically as needed.       Current Facility-Administered Medications   Medication Dose Route Frequency Provider Last Rate Last Admin    triamcinolone acetonide

## 2024-07-12 NOTE — RESULT ENCOUNTER NOTE
Call back diagnostic PSA level actually improved slightly to 5.7%.  Serum iron level in normal range at 63 with white blood cell count hemoglobin and platelets normal.  Hemoglobin 15.4.

## 2024-07-18 DIAGNOSIS — R06.83 SNORING: Primary | ICD-10-CM

## 2024-07-22 PROBLEM — E55.9 VITAMIN D DEFICIENCY: Status: ACTIVE | Noted: 2024-07-22

## 2024-07-22 PROBLEM — E04.2 MULTIPLE THYROID NODULES: Status: ACTIVE | Noted: 2024-07-22

## 2024-07-22 ASSESSMENT — ENCOUNTER SYMPTOMS
CONSTIPATION: 0
BLOOD IN STOOL: 0
DIARRHEA: 0

## 2024-07-22 NOTE — PROGRESS NOTES
Negative for blood in stool, constipation and diarrhea.   Psychiatric/Behavioral:  Positive for sleep disturbance. Negative for dysphoric mood. The patient is nervous/anxious.        /84 (Site: Left Upper Arm, Position: Sitting, Cuff Size: Large Adult)   Pulse 88   Wt 119.3 kg (263 lb)   BMI 34.71 kg/m²   Wt Readings from Last 3 Encounters:   07/23/24 119.3 kg (263 lb)   07/11/24 120.7 kg (266 lb 1 oz)   06/17/24 119.6 kg (263 lb 10.7 oz)       Physical Exam  Constitutional:       Appearance: Normal appearance.   HENT:      Head: Normocephalic.   Neck:      Thyroid: No thyroid mass or thyromegaly.   Cardiovascular:      Rate and Rhythm: Normal rate and regular rhythm.   Pulmonary:      Effort: Pulmonary effort is normal.      Breath sounds: Normal breath sounds.   Neurological:      Mental Status: He is alert.   Psychiatric:         Mood and Affect: Mood normal.         Behavior: Behavior normal.         Orders Placed This Encounter   Procedures    Comprehensive Metabolic Panel     Standing Status:   Future     Standing Expiration Date:   7/23/2025    Vitamin D 25 Hydroxy     Standing Status:   Future     Standing Expiration Date:   7/23/2025    Comprehensive Metabolic Panel     Standing Status:   Future     Standing Expiration Date:   7/23/2025    Vitamin D 25 Hydroxy     Standing Status:   Future     Standing Expiration Date:   7/23/2025       Current Outpatient Medications   Medication Sig Dispense Refill    amLODIPine-benazepril (LOTREL) 5-10 MG per capsule Take 1 capsule by mouth daily 90 capsule 2    tadalafil (CIALIS) 5 MG tablet Take 1 tablet by mouth as needed for Erectile Dysfunction 90 tablet 2    trolamine salicylate (BLUE-EMU HEMP) 10 % cream Apply topically as needed for Pain Apply topically as needed.       No current facility-administered medications for this visit.       MIRIAN Vale MD, FACE      Portions of this note were generated with the assistance of voice recognition software.  As

## 2024-07-23 ENCOUNTER — OFFICE VISIT (OUTPATIENT)
Dept: ENDOCRINOLOGY | Age: 69
End: 2024-07-23
Payer: MEDICARE

## 2024-07-23 VITALS
WEIGHT: 263 LBS | BODY MASS INDEX: 34.71 KG/M2 | HEART RATE: 88 BPM | DIASTOLIC BLOOD PRESSURE: 84 MMHG | SYSTOLIC BLOOD PRESSURE: 122 MMHG

## 2024-07-23 DIAGNOSIS — M81.0 AGE-RELATED OSTEOPOROSIS WITHOUT CURRENT PATHOLOGICAL FRACTURE: ICD-10-CM

## 2024-07-23 DIAGNOSIS — E21.0 PRIMARY HYPERPARATHYROIDISM (HCC): ICD-10-CM

## 2024-07-23 DIAGNOSIS — E21.0 PRIMARY HYPERPARATHYROIDISM (HCC): Primary | ICD-10-CM

## 2024-07-23 DIAGNOSIS — E55.9 VITAMIN D DEFICIENCY: ICD-10-CM

## 2024-07-23 DIAGNOSIS — E04.2 MULTIPLE THYROID NODULES: ICD-10-CM

## 2024-07-23 PROCEDURE — 1123F ACP DISCUSS/DSCN MKR DOCD: CPT | Performed by: INTERNAL MEDICINE

## 2024-07-23 PROCEDURE — G8417 CALC BMI ABV UP PARAM F/U: HCPCS | Performed by: INTERNAL MEDICINE

## 2024-07-23 PROCEDURE — 3079F DIAST BP 80-89 MM HG: CPT | Performed by: INTERNAL MEDICINE

## 2024-07-23 PROCEDURE — G8427 DOCREV CUR MEDS BY ELIG CLIN: HCPCS | Performed by: INTERNAL MEDICINE

## 2024-07-23 PROCEDURE — G2211 COMPLEX E/M VISIT ADD ON: HCPCS | Performed by: INTERNAL MEDICINE

## 2024-07-23 PROCEDURE — 3074F SYST BP LT 130 MM HG: CPT | Performed by: INTERNAL MEDICINE

## 2024-07-23 PROCEDURE — 1036F TOBACCO NON-USER: CPT | Performed by: INTERNAL MEDICINE

## 2024-07-23 PROCEDURE — 3017F COLORECTAL CA SCREEN DOC REV: CPT | Performed by: INTERNAL MEDICINE

## 2024-07-23 PROCEDURE — 99214 OFFICE O/P EST MOD 30 MIN: CPT | Performed by: INTERNAL MEDICINE

## 2024-07-24 LAB
25(OH)D3 SERPL-MCNC: 35.3 NG/ML (ref 30–100)
ALBUMIN SERPL-MCNC: 4 G/DL (ref 3.2–4.6)
ALBUMIN/GLOB SERPL: 1.4 (ref 1–1.9)
ALP SERPL-CCNC: 76 U/L (ref 40–129)
ALT SERPL-CCNC: 23 U/L (ref 12–65)
ANION GAP SERPL CALC-SCNC: 10 MMOL/L (ref 9–18)
AST SERPL-CCNC: 25 U/L (ref 15–37)
BILIRUB SERPL-MCNC: 0.3 MG/DL (ref 0–1.2)
BUN SERPL-MCNC: 10 MG/DL (ref 8–23)
CALCIUM SERPL-MCNC: 9.6 MG/DL (ref 8.8–10.2)
CHLORIDE SERPL-SCNC: 102 MMOL/L (ref 98–107)
CO2 SERPL-SCNC: 24 MMOL/L (ref 20–28)
CREAT SERPL-MCNC: 0.85 MG/DL (ref 0.8–1.3)
GLOBULIN SER CALC-MCNC: 2.9 G/DL (ref 2.3–3.5)
GLUCOSE SERPL-MCNC: 113 MG/DL (ref 70–99)
POTASSIUM SERPL-SCNC: 4.4 MMOL/L (ref 3.5–5.1)
PROT SERPL-MCNC: 6.9 G/DL (ref 6.3–8.2)
SODIUM SERPL-SCNC: 136 MMOL/L (ref 136–145)

## 2024-08-12 ENCOUNTER — OFFICE VISIT (OUTPATIENT)
Dept: ORTHOPEDIC SURGERY | Age: 69
End: 2024-08-12
Payer: MEDICARE

## 2024-08-12 VITALS — HEIGHT: 72 IN | WEIGHT: 263 LBS | BODY MASS INDEX: 35.62 KG/M2

## 2024-08-12 DIAGNOSIS — M17.11 PRIMARY OSTEOARTHRITIS OF RIGHT KNEE: ICD-10-CM

## 2024-08-12 DIAGNOSIS — M25.561 ACUTE PAIN OF RIGHT KNEE: Primary | ICD-10-CM

## 2024-08-12 PROCEDURE — 1123F ACP DISCUSS/DSCN MKR DOCD: CPT | Performed by: ORTHOPAEDIC SURGERY

## 2024-08-12 PROCEDURE — 3017F COLORECTAL CA SCREEN DOC REV: CPT | Performed by: ORTHOPAEDIC SURGERY

## 2024-08-12 PROCEDURE — G8428 CUR MEDS NOT DOCUMENT: HCPCS | Performed by: ORTHOPAEDIC SURGERY

## 2024-08-12 PROCEDURE — 99204 OFFICE O/P NEW MOD 45 MIN: CPT | Performed by: ORTHOPAEDIC SURGERY

## 2024-08-12 PROCEDURE — 1036F TOBACCO NON-USER: CPT | Performed by: ORTHOPAEDIC SURGERY

## 2024-08-12 PROCEDURE — G8417 CALC BMI ABV UP PARAM F/U: HCPCS | Performed by: ORTHOPAEDIC SURGERY

## 2024-08-12 NOTE — PROGRESS NOTES
Name: Vijay Godinez  YOB: 1955  Gender: male  MRN: [id number]    CC: Right knee pain    HPI: Vijay Godinez is a 69 y.o. male who presents with a several year history of intermittent right knee pain with progressive worsening.  He has been generally followed by his primary care doctor has been giving an occasional cortisone injection.  The patient states he had about 3 injections.  The first gave him several months of relief.  He had another 1 in July which he thinks is still helping.  He describes pain with weightbearing and difficulty getting up and down.  He has some difficulty with uneven ground and walking extended distances.  He does find stairs problematic more more.    His general health has been fairly healthy although he has had a diagnosis of prostate cancer which is currently under active surveillance with Dr. Mccabe at [facility].  He also has been diagnosed with parathyroidism and had some intervention there.    His symptoms been worsening and with that he comes in today for further recommendations and treatment.    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  He is a [employer] graduate.  His wife is a nursing instructor for [employer].    ROS/Meds/PSH/PMH/FH/SH: I personally reviewed the patients standard intake form.  Below are the pertinents    Tobacco:  reports that he has never smoked. He has quit using smokeless tobacco.  His smokeless tobacco use included chew.  Past Medical History:   Diagnosis Date    Chronic pain of right knee     Elevated prostate specific antigen (PSA)     Essential hypertension     Gastroesophageal reflux disease     History of acute pancreatitis 2007    History of anemia     History of blood transfusion 2007    Iron deficiency anemia     Multiple thyroid nodules     Osteoporosis     Peptic ulcer disease     Primary hyperparathyroidism (HCC)     Prostate cancer (HCC)     Vitamin D deficiency       Past

## 2024-08-13 ENCOUNTER — TELEPHONE (OUTPATIENT)
Dept: ORTHOPEDIC SURGERY | Age: 69
End: 2024-08-13

## 2024-08-13 NOTE — TELEPHONE ENCOUNTER
Pt  wants to make  sure that you have  him written  down  for  surgery on December 3rd.  He said the two of you were discussing at  Mondays  appt

## 2024-09-10 ENCOUNTER — HOSPITAL ENCOUNTER (OUTPATIENT)
Dept: SLEEP CENTER | Age: 69
Discharge: HOME | End: 2024-09-13

## 2024-09-10 DIAGNOSIS — M17.11 PRIMARY OSTEOARTHRITIS OF RIGHT KNEE: Primary | ICD-10-CM

## 2024-11-07 ENCOUNTER — PREP FOR PROCEDURE (OUTPATIENT)
Dept: ORTHOPEDIC SURGERY | Age: 69
End: 2024-11-07

## 2024-11-07 ENCOUNTER — HOSPITAL ENCOUNTER (OUTPATIENT)
Dept: SLEEP CENTER | Age: 69
Discharge: HOME | End: 2024-11-10
Payer: MEDICARE

## 2024-11-07 DIAGNOSIS — M17.11 PRIMARY OSTEOARTHRITIS OF RIGHT KNEE: Primary | ICD-10-CM

## 2024-11-07 PROCEDURE — 95806 SLEEP STUDY UNATT&RESP EFFT: CPT

## 2024-11-07 RX ORDER — ACETAMINOPHEN 500 MG
1000 TABLET ORAL ONCE
Status: CANCELLED | OUTPATIENT
Start: 2024-11-07 | End: 2024-11-07

## 2024-11-12 ENCOUNTER — HOSPITAL ENCOUNTER (OUTPATIENT)
Dept: REHABILITATION | Age: 69
Discharge: HOME | End: 2024-11-15
Payer: MEDICARE

## 2024-11-12 ENCOUNTER — HOSPITAL ENCOUNTER (OUTPATIENT)
Dept: SURGERY | Age: 69
Discharge: HOME | End: 2024-11-15
Payer: MEDICARE

## 2024-11-12 VITALS
TEMPERATURE: 97.5 F | BODY MASS INDEX: 34.01 KG/M2 | SYSTOLIC BLOOD PRESSURE: 131 MMHG | OXYGEN SATURATION: 97 % | RESPIRATION RATE: 16 BRPM | WEIGHT: 265 LBS | DIASTOLIC BLOOD PRESSURE: 86 MMHG | HEART RATE: 76 BPM | HEIGHT: 74 IN

## 2024-11-12 DIAGNOSIS — M17.11 PRIMARY OSTEOARTHRITIS OF RIGHT KNEE: ICD-10-CM

## 2024-11-12 LAB
ALBUMIN SERPL-MCNC: 3.6 G/DL (ref 3.2–4.6)
ALBUMIN/GLOB SERPL: 1.2 (ref 1–1.9)
ALP SERPL-CCNC: 65 U/L (ref 40–129)
ALT SERPL-CCNC: 21 U/L (ref 8–55)
ANION GAP SERPL CALC-SCNC: 12 MMOL/L (ref 7–16)
AST SERPL-CCNC: 20 U/L (ref 15–37)
BASOPHILS # BLD: 0.1 K/UL (ref 0–0.2)
BASOPHILS NFR BLD: 1 % (ref 0–2)
BILIRUB SERPL-MCNC: 0.3 MG/DL (ref 0–1.2)
BUN SERPL-MCNC: 15 MG/DL (ref 8–23)
CALCIUM SERPL-MCNC: 9.2 MG/DL (ref 8.8–10.2)
CHLORIDE SERPL-SCNC: 103 MMOL/L (ref 98–107)
CO2 SERPL-SCNC: 24 MMOL/L (ref 20–29)
CREAT SERPL-MCNC: 0.81 MG/DL (ref 0.8–1.3)
DIFFERENTIAL METHOD BLD: NORMAL
EKG ATRIAL RATE: 79 BPM
EKG DIAGNOSIS: NORMAL
EKG P AXIS: 42 DEGREES
EKG P-R INTERVAL: 190 MS
EKG Q-T INTERVAL: 374 MS
EKG QRS DURATION: 86 MS
EKG QTC CALCULATION (BAZETT): 428 MS
EKG R AXIS: 41 DEGREES
EKG T AXIS: 28 DEGREES
EKG VENTRICULAR RATE: 79 BPM
EOSINOPHIL # BLD: 0.4 K/UL (ref 0–0.8)
EOSINOPHIL NFR BLD: 7 % (ref 0.5–7.8)
ERYTHROCYTE [DISTWIDTH] IN BLOOD BY AUTOMATED COUNT: 12.2 % (ref 11.9–14.6)
EST. AVERAGE GLUCOSE BLD GHB EST-MCNC: 126 MG/DL
GLOBULIN SER CALC-MCNC: 3 G/DL (ref 2.3–3.5)
GLUCOSE SERPL-MCNC: 110 MG/DL (ref 70–99)
HBA1C MFR BLD: 6 % (ref 0–5.6)
HCT VFR BLD AUTO: 43.4 % (ref 41.1–50.3)
HGB BLD-MCNC: 14.7 G/DL (ref 13.6–17.2)
IMM GRANULOCYTES # BLD AUTO: 0.1 K/UL (ref 0–0.5)
IMM GRANULOCYTES NFR BLD AUTO: 1 % (ref 0–5)
INR PPP: 1.2
LYMPHOCYTES # BLD: 1.3 K/UL (ref 0.5–4.6)
LYMPHOCYTES NFR BLD: 21 % (ref 13–44)
MCH RBC QN AUTO: 29.6 PG (ref 26.1–32.9)
MCHC RBC AUTO-ENTMCNC: 33.9 G/DL (ref 31.4–35)
MCV RBC AUTO: 87.3 FL (ref 82–102)
MONOCYTES # BLD: 0.6 K/UL (ref 0.1–1.3)
MONOCYTES NFR BLD: 9 % (ref 4–12)
MRSA DNA SPEC QL NAA+PROBE: NOT DETECTED
NEUTS SEG # BLD: 3.6 K/UL (ref 1.7–8.2)
NEUTS SEG NFR BLD: 60 % (ref 43–78)
NRBC # BLD: 0 K/UL (ref 0–0.2)
PLATELET # BLD AUTO: 196 K/UL (ref 150–450)
PMV BLD AUTO: 9.5 FL (ref 9.4–12.3)
POTASSIUM SERPL-SCNC: 4.4 MMOL/L (ref 3.5–5.1)
PROT SERPL-MCNC: 6.6 G/DL (ref 6.3–8.2)
PROTHROMBIN TIME: 15.3 SEC (ref 11.3–14.9)
RBC # BLD AUTO: 4.97 M/UL (ref 4.23–5.6)
S AUREUS CPE NOSE QL NAA+PROBE: DETECTED
SODIUM SERPL-SCNC: 139 MMOL/L (ref 136–145)
WBC # BLD AUTO: 6 K/UL (ref 4.3–11.1)

## 2024-11-12 PROCEDURE — 83036 HEMOGLOBIN GLYCOSYLATED A1C: CPT

## 2024-11-12 PROCEDURE — 85025 COMPLETE CBC W/AUTO DIFF WBC: CPT

## 2024-11-12 PROCEDURE — 93005 ELECTROCARDIOGRAM TRACING: CPT | Performed by: ANESTHESIOLOGY

## 2024-11-12 PROCEDURE — 80053 COMPREHEN METABOLIC PANEL: CPT

## 2024-11-12 PROCEDURE — 97161 PT EVAL LOW COMPLEX 20 MIN: CPT

## 2024-11-12 PROCEDURE — 87641 MR-STAPH DNA AMP PROBE: CPT

## 2024-11-12 PROCEDURE — 94760 N-INVAS EAR/PLS OXIMETRY 1: CPT

## 2024-11-12 PROCEDURE — 98960 EDU&TRN PT SELF-MGMT NQHP 1: CPT

## 2024-11-12 PROCEDURE — 85610 PROTHROMBIN TIME: CPT

## 2024-11-12 RX ORDER — NAPROXEN 250 MG/1
250 TABLET ORAL 2 TIMES DAILY WITH MEALS
COMMUNITY

## 2024-11-12 RX ORDER — ACETAMINOPHEN 500 MG
500 TABLET ORAL EVERY 6 HOURS PRN
COMMUNITY

## 2024-11-12 ASSESSMENT — KOOS JR
KOOS JR TOTAL INTERVAL SCORE: 52.465
TWISING OR PIVOTING ON KNEE: MILD
HOW SEVERE IS YOUR KNEE STIFFNESS AFTER FIRST WAKING IN MORNING: MODERATE
STANDING UPRIGHT: MODERATE
GOING UP OR DOWN STAIRS: SEVERE
BENDING TO THE FLOOR TO PICK UP OBJECT: SEVERE
STRAIGHTENING KNEE FULLY: MILD
RISING FROM SITTING: MODERATE

## 2024-11-12 ASSESSMENT — PROMIS GLOBAL HEALTH SCALE
IN GENERAL, HOW WOULD YOU RATE YOUR SATISFACTION WITH YOUR SOCIAL ACTIVITIES AND RELATIONSHIPS [ON A SCALE OF 1 (POOR) TO 5 (EXCELLENT)]?: GOOD
SUM OF RESPONSES TO QUESTIONS 3, 6, 7, & 8: 13
TO WHAT EXTENT ARE YOU ABLE TO CARRY OUT YOUR EVERYDAY PHYSICAL ACTIVITIES SUCH AS WALKING, CLIMBING STAIRS, CARRYING GROCERIES, OR MOVING A CHAIR [ON A SCALE OF 1 (NOT AT ALL) TO 5 (COMPLETELY)]?: MOSTLY
HOW IS THE PROMIS V1.1 BEING ADMINISTERED?: PAPER
IN GENERAL, WOULD YOU SAY YOUR HEALTH IS...[ON A SCALE OF 1 (POOR) TO 5 (EXCELLENT)]: GOOD
IN GENERAL, HOW WOULD YOU RATE YOUR MENTAL HEALTH, INCLUDING YOUR MOOD AND YOUR ABILITY TO THINK [ON A SCALE OF 1 (POOR) TO 5 (EXCELLENT)]?: GOOD
IN THE PAST 7 DAYS, HOW OFTEN HAVE YOU BEEN BOTHERED BY EMOTIONAL PROBLEMS, SUCH AS FEELING ANXIOUS, DEPRESSED, OR IRRITABLE [ON A SCALE FROM 1 (NEVER) TO 5 (ALWAYS)]?: SOMETIMES
IN GENERAL, PLEASE RATE HOW WELL YOU CARRY OUT YOUR USUAL SOCIAL ACTIVITIES (INCLUDES ACTIVITIES AT HOME, AT WORK, AND IN YOUR COMMUNITY, AND RESPONSIBILITIES AS A PARENT, CHILD, SPOUSE, EMPLOYEE, FRIEND, ETC) [ON A SCALE OF 1 (POOR) TO 5 (EXCELLENT)]?: GOOD
SUM OF RESPONSES TO QUESTIONS 2, 4, 5, & 10: 12
WHO IS THE PERSON COMPLETING THE PROMIS V1.1 SURVEY?: SELF
IN GENERAL, WOULD YOU SAY YOUR QUALITY OF LIFE IS...[ON A SCALE OF 1 (POOR) TO 5 (EXCELLENT)]: GOOD
IN GENERAL, HOW WOULD YOU RATE YOUR PHYSICAL HEALTH [ON A SCALE OF 1 (POOR) TO 5 (EXCELLENT)]?: GOOD
IN THE PAST 7 DAYS, HOW WOULD YOU RATE YOUR FATIGUE ON AVERAGE [ON A SCALE FROM 1 (NONE) TO 5 (VERY SEVERE)]?: SEVERE
IN THE PAST 7 DAYS, HOW WOULD YOU RATE YOUR PAIN ON AVERAGE [ON A SCALE FROM 0 (NO PAIN) TO 10 (WORST IMAGINABLE PAIN)]?: 4

## 2024-11-12 ASSESSMENT — PULMONARY FUNCTION TESTS
FEV1 (LITERS): 3.3
FEV1 (%PREDICTED): 100

## 2024-11-12 ASSESSMENT — PAIN SCALES - GENERAL
PAINLEVEL_OUTOF10: 0
PAINLEVEL_OUTOF10: 4

## 2024-11-12 ASSESSMENT — PAIN DESCRIPTION - DESCRIPTORS: DESCRIPTORS: PRESSURE;TINGLING

## 2024-11-12 NOTE — PROGRESS NOTES
Right  Genu Varus Left  Leg length discrepancy   R LE 1/8\" shorter than L    GAIT: I Mod I S SBA CGA Min Mod Max Total  NT x2 Comments:   Level of Assistance [x] [] [] [] [] [] [] [] [] [] []    Weightbearing Status       Distance  300 feet    Gait Quality Antalgic, Circumduction, Decreased step clearance, and Decreased step length    DME None     Stairs      Ramp     I=Independent, Mod I=Modified Independent, S=Supervision, SBA=Standby Assistance, CGA=Contact Guard Assistance,   Min=Minimal Assistance, Mod=Moderate Assistance, Max=Maximal Assistance, Total=Total Assistance, NT=Not Tested    TREATMENT:   EVALUATION: LOW COMPLEXITY: (Untimed Charge)  The initial evaluation charge encompasses clinical chart review, objective assessment, interpretation of assessment, and skilled monitoring of the patient's response to treatment in order to develop a plan of care.     TREATMENT PLAN:   Effective Dates: 11/12/2024 TO 11/12/2024.    Treatment/Session Assessment:  Patient was instructed in PT- HEP to increase strength and ROM in LEs.  Answered all questions.  Frequency/Duration: Patient to continue to perform home exercise program at least twice per day up until his surgery.    EDUCATION: Education Given To: Patient  Education Provided: Role of Therapy, Plan of Care, Home Exercise Program, Precautions  Education Method: Demonstration, Verbal, Teach Back, Printed Information/Hand-outs  Education Outcome: Verbalized understanding, Demonstrated understanding    MEDICAL NECESSITY: Mr. Godinez is expected to optimize hislower extremity strength and ROM in preparation for joint replacement surgery.    REASON FOR CONTINUED SERVICES: Achieve baseline assesment of musculoskeletal system, functional mobility and home environment., educate in PT HEP in preparation for surgery, educate in hospital plan of care.    COMPLIANCE WITH PROGRAM/EXERCISE: compliant most of the time.    TOTAL TREATMENT DURATION:  Time In: 0815  Time Out:

## 2024-11-12 NOTE — PERIOP NOTE
Patient verified name and .    Order for consent found in EHR and matches case posting; patient verified.     Type 3 surgery, joint assessment complete.    Labs per surgeon: CBC,CMP, PT/INR, A1C; results pending.   Labs per anesthesia protocol: no additional  EKG: Completed today; results within anesthesia guidelines.     Endocrinology office note (24) and Urology office note (24) located in EHR if needed.    MRSA/MSSA swab collected per policy. MD to consult pharmacy to dose Vanc if appropriate.     Hospital approved surgical skin cleanser and instructions to return bottle on DOS given per hospital policy.    Patient provided with handouts including Guide to Surgery, Pain Management, Preventing Surgical Site Infections, and [facility] Brochure.    Patient answered medical/surgical history questions at their best of ability. All prior to admission medications documented in Epic. Original medication prescription bottle NOT visualized during patient appointment.     Patient instructed to hold all vitamins 3 weeks prior to surgery and NSAIDS 5 days prior to surgery.     Hold Cialis 5 days prior to surgery.     **Please drink 32 ounces of non-caffeinated clear liquids, on the day of surgery, 2 hours prior to your arrival time to avoid dehydration.     Patient teach back successful and patient demonstrates knowledge of instruction.

## 2024-11-12 NOTE — PERIOP NOTE
PLEASE CONTINUE TAKING ALL PRESCRIPTION MEDICATIONS UP TO THE DAY OF SURGERY UNLESS OTHERWISE DIRECTED BELOW.     DISCONTINUE all over the counter vitamins, supplements, and herbals 21 days prior to surgery. DISCONTINUE Non-Steroidal Anti-Inflammatory (NSAIDS) such as Advil, Ibuprofen, Motrin, Naproxen, and Aleve 5 days prior to surgery.      *IT IS OK TO TAKE TYLENOL, Allergy medication, and indigestion medications*     Prescription medications to HOLD     Hold Tadalafil (Cialis) 5 days prior to surgery.           CONTINUE all other prescriptions like normal up until the day of surgery--TAKE ONLY THE BELOW MEDICATIONS THE DAY OF SURGERY.    NONE          Comments   The day before surgery please take 2 Tylenol in the morning and then again before bed. You may use either regular or extra strength.    Bring Hibiclens soap and incentive spirometer back to the hospital.    **Please drink 32 ounces of non-caffeinated clear liquids, on the day of surgery, 2 hours prior to your arrival time to avoid dehydration.          Please do not bring home medications with you on the day of surgery unless otherwise directed by your nurse.  If you are instructed to bring home medications, please give them to your nurse as they will be administered by the nursing staff.     If you have any questions, please call [facility] [phone].     A copy of this note was provided to the patient for reference.

## 2024-11-12 NOTE — PROGRESS NOTES
11/12/24 0828   Treatment   Treatment Type Bedside spirometry   Breath Sounds   Breath Sounds Bilateral Clear   Oxygen Therapy/Pulse Ox   O2 Therapy Room air   Pulse 76   SpO2 97 %   Pulse Oximeter Device Mode Intermittent   $Pulse Oximeter $Spot check (single)   Bedside Spirometry   FEV-1/Actual (Liters) 3.3 Liters   FEV-1/Predicted (Liters) 100 Liters     Initial respiratory Assessment completed with pt. Pt was interviewed and evaluated in Joint camp prior to surgery.  Patient ID:  Vijay Godinez  [id number]  69 y.o.  1955  Surgeon: Dr. Galeana  Date of Surgery: [unfilled]12/3/2024  Procedure: Total Right Knee Arthroplasty  Primary Care Physician: Nomi Collins [phone]  Specialists:    Pt taught proper COUGH technique  IS REVIEWED WITH PT AS WELL AS BENEFITS OF USING IS IN SEDENTARY PTS.  DIAPHRAGMATIC BREATHING EXERCISE INSTRUCTIONS GIVEN    History of smoking:   DENIES                 Quit date:         Secondhand smoke:FATHER    Past procedures with Oxygen desaturation or delayed awakening:DENIES     Respiratory history:DENIES SOB  HX OF COUGH                              HX OF PNA- HOSPITALIZED  RECENT SLEEP STUDY- NO RESULTS YET                                  Respiratory meds:  DENIES    FAMILY PRESENT:            WIFE  PAST SLEEP STUDY:        YES                   HST LAST WEEK  HX OF PARKER:                                        DENIES  PARKER assessment:     DANGERS OF UNTREATED PARKER EXPLAINED TO PT.                                          SLEEPS ON SIDE       &      BACK         PHYSICAL EXAM   Body mass index is 34.02 kg/m².   Vitals:    11/12/24 0828   BP:    Pulse: (P) 76   Resp:    Temp:    SpO2: (P) 97%     Neck circumference:   48   cm    Loud snoring:                                                 YES            Witnessed apnea or wakening gasping or choking:         APNEA  Awakens with headaches:                                               DENIES  Morning or 
         YES                                            Castillo stage:  4                                   SACS score:110  Stop Bang                                PT NON-COMPLIANT with CPA some of the time.   Dangers of non-compliance with treatment of PARKER explained to pt.  PARKER handouts given to pt.    ALBUTEROL Q 6 PRN  CONT. SAT MONITOR HS after surgery.  O2 @ 3 lpm NC HS   RESPIRATORY ASSESSMENT Q SHIFT.                                            Referrals:    Pt. Phone Number:

## 2024-11-12 NOTE — PERIOP NOTE
Prothrombin time results to be reviewed by anesthesia--charge nurse to f/u. All other results, listed below, are within anesthesia guidelines. Labs automatically routed to ordering provider via Epic documentation.       Latest Reference Range & Units 11/12/24 07:27   Sodium 136 - 145 mmol/L 139   Potassium 3.5 - 5.1 mmol/L 4.4   Chloride 98 - 107 mmol/L 103   CARBON DIOXIDE 20 - 29 mmol/L 24   BUN,BUNPL 8 - 23 MG/DL 15   Creatinine 0.80 - 1.30 MG/DL 0.81   Anion Gap 7 - 16 mmol/L 12   Est, Glom Filt Rate >60 ml/min/1.73m2 >90   Glucose 70 - 99 mg/dL 110 (H)   Calcium 8.8 - 10.2 MG/DL 9.2   Albumin/Globulin Ratio 1.0 - 1.9   1.2   Total Protein 6.3 - 8.2 g/dL 6.6   Albumin 3.2 - 4.6 g/dL 3.6   Globulin 2.3 - 3.5 g/dL 3.0   Alkaline Phosphatase 40 - 129 U/L 65   ALT 8 - 55 U/L 21   AST 15 - 37 U/L 20   Total Bilirubin 0.0 - 1.2 MG/DL 0.3   Hemoglobin A1C 0 - 5.6 % 6.0 (H)   eAG (mg/dL) mg/dL 126   WBC 4.3 - 11.1 K/uL 6.0   RBC 4.23 - 5.6 M/uL 4.97   Hemoglobin Quant 13.6 - 17.2 g/dL 14.7   Hematocrit 41.1 - 50.3 % 43.4   MCV 82.0 - 102.0 FL 87.3   MCH 26.1 - 32.9 PG 29.6   MCHC 31.4 - 35.0 g/dL 33.9   MPV 9.4 - 12.3 FL 9.5   RDW 11.9 - 14.6 % 12.2   Platelet Count 150 - 450 K/uL 196   Neutrophils % 43 - 78 % 60   Lymphocyte % 13 - 44 % 21   Monocytes % 4.0 - 12.0 % 9   Eosinophils % 0.5 - 7.8 % 7   Basophils % 0.0 - 2.0 % 1   Neutrophils Absolute 1.7 - 8.2 K/UL 3.6   Lymphocytes Absolute 0.5 - 4.6 K/UL 1.3   Monocytes Absolute 0.1 - 1.3 K/UL 0.6   Eosinophils Absolute 0.0 - 0.8 K/UL 0.4   Basophils Absolute 0.0 - 0.2 K/UL 0.1   Differential Type -   AUTOMATED   Immature Granulocytes % 0.0 - 5.0 % 1   Nucleated Red Blood Cells 0.0 - 0.2 K/uL 0.00   Immature Granulocytes Absolute 0.0 - 0.5 K/UL 0.1   Prothrombin Time 11.3 - 14.9 sec 15.3 (H)   INR -   1.2   MRSA/STAPH AUREUS DNA  Rpt (IP)   (H): Data is abnormally high  (IP): In Process  Rpt: View report in Results Review for more information

## 2024-11-12 NOTE — CARE COORDINATION
[facility] Case Management note:  Patient screened in Prehab for discharge planning needs. Patient scheduled for a future total joint replacement.  We discussed Home Health and equipment needed after surgery. List of Home Health providers offered.  Patient w/o preference towards provider.  We will arrange Home health on the day of surgery. Pt has a walker and plans to buy a HD BSC from [employer] ahead of time. Pt given contact info.

## 2024-11-19 ENCOUNTER — TELEPHONE (OUTPATIENT)
Dept: SLEEP MEDICINE | Age: 69
End: 2024-11-19

## 2024-11-21 ENCOUNTER — OFFICE VISIT (OUTPATIENT)
Dept: SLEEP MEDICINE | Age: 69
End: 2024-11-21
Payer: MEDICARE

## 2024-11-21 VITALS
WEIGHT: 268 LBS | OXYGEN SATURATION: 96 % | BODY MASS INDEX: 34.39 KG/M2 | DIASTOLIC BLOOD PRESSURE: 86 MMHG | RESPIRATION RATE: 14 BRPM | HEART RATE: 84 BPM | HEIGHT: 74 IN | SYSTOLIC BLOOD PRESSURE: 132 MMHG

## 2024-11-21 DIAGNOSIS — G47.10 HYPERSOMNIA: ICD-10-CM

## 2024-11-21 DIAGNOSIS — G47.33 OSA (OBSTRUCTIVE SLEEP APNEA): Primary | ICD-10-CM

## 2024-11-21 DIAGNOSIS — R06.81 WITNESSED APNEIC SPELLS: ICD-10-CM

## 2024-11-21 DIAGNOSIS — E66.811 OBESITY (BMI 30.0-34.9): ICD-10-CM

## 2024-11-21 DIAGNOSIS — R06.83 SNORING: ICD-10-CM

## 2024-11-21 DIAGNOSIS — G47.34 NOCTURNAL HYPOXEMIA: ICD-10-CM

## 2024-11-21 DIAGNOSIS — G47.00 PERSISTENT DISORDER OF INITIATING OR MAINTAINING SLEEP: ICD-10-CM

## 2024-11-21 PROCEDURE — G8417 CALC BMI ABV UP PARAM F/U: HCPCS | Performed by: NURSE PRACTITIONER

## 2024-11-21 PROCEDURE — 3075F SYST BP GE 130 - 139MM HG: CPT | Performed by: NURSE PRACTITIONER

## 2024-11-21 PROCEDURE — 99203 OFFICE O/P NEW LOW 30 MIN: CPT | Performed by: NURSE PRACTITIONER

## 2024-11-21 PROCEDURE — 1160F RVW MEDS BY RX/DR IN RCRD: CPT | Performed by: NURSE PRACTITIONER

## 2024-11-21 PROCEDURE — 1036F TOBACCO NON-USER: CPT | Performed by: NURSE PRACTITIONER

## 2024-11-21 PROCEDURE — 1159F MED LIST DOCD IN RCRD: CPT | Performed by: NURSE PRACTITIONER

## 2024-11-21 PROCEDURE — G8427 DOCREV CUR MEDS BY ELIG CLIN: HCPCS | Performed by: NURSE PRACTITIONER

## 2024-11-21 PROCEDURE — G2211 COMPLEX E/M VISIT ADD ON: HCPCS | Performed by: NURSE PRACTITIONER

## 2024-11-21 PROCEDURE — 3017F COLORECTAL CA SCREEN DOC REV: CPT | Performed by: NURSE PRACTITIONER

## 2024-11-21 PROCEDURE — 3079F DIAST BP 80-89 MM HG: CPT | Performed by: NURSE PRACTITIONER

## 2024-11-21 PROCEDURE — 1123F ACP DISCUSS/DSCN MKR DOCD: CPT | Performed by: NURSE PRACTITIONER

## 2024-11-21 PROCEDURE — G8484 FLU IMMUNIZE NO ADMIN: HCPCS | Performed by: NURSE PRACTITIONER

## 2024-11-21 ASSESSMENT — SLEEP AND FATIGUE QUESTIONNAIRES
HOW LIKELY ARE YOU TO NOD OFF OR FALL ASLEEP WHILE WATCHING TV: HIGH CHANCE OF DOZING
HOW LIKELY ARE YOU TO NOD OFF OR FALL ASLEEP WHILE LYING DOWN TO REST IN THE AFTERNOON WHEN CIRCUMSTANCES PERMIT: SLIGHT CHANCE OF DOZING
ESS TOTAL SCORE: 16
HOW LIKELY ARE YOU TO NOD OFF OR FALL ASLEEP WHEN YOU ARE A PASSENGER IN A CAR FOR AN HOUR WITHOUT A BREAK: SLIGHT CHANCE OF DOZING
HOW LIKELY ARE YOU TO NOD OFF OR FALL ASLEEP WHILE SITTING AND READING: HIGH CHANCE OF DOZING
HOW LIKELY ARE YOU TO NOD OFF OR FALL ASLEEP WHILE SITTING AND TALKING TO SOMEONE: HIGH CHANCE OF DOZING
HOW LIKELY ARE YOU TO NOD OFF OR FALL ASLEEP IN A CAR, WHILE STOPPED FOR A FEW MINUTES IN TRAFFIC: SLIGHT CHANCE OF DOZING
HOW LIKELY ARE YOU TO NOD OFF OR FALL ASLEEP WHILE SITTING QUIETLY AFTER LUNCH WITHOUT ALCOHOL: MODERATE CHANCE OF DOZING
HOW LIKELY ARE YOU TO NOD OFF OR FALL ASLEEP WHILE SITTING INACTIVE IN A PUBLIC PLACE: MODERATE CHANCE OF DOZING

## 2024-11-21 NOTE — PROGRESS NOTES
[facility]  [address]  [place] [address]  [phone]    Patient Name:  Vijay Godinez  YOB: 1955      Office Visit 11/21/2024    CHIEF COMPLAINT:    Chief Complaint   Patient presents with    New Patient    Sleep Apnea       HISTORY OF PRESENT ILLNESS:      The patient presents in outpatient consultation at the request of Dr. Lam for management of obstructive sleep apnea.  Significant PMH of hypertension, GERD, hypoparathyroidism, and obesity.     The diagnostic polysomnography was notable for an apnea hypopnea index of 16.4 including 60 obstructive apneas, 57 hypopneas, and 3 central apneas.  Oxygen desaturations are low as 75% were noted with SpO2 less than 89% for a total of 31.5 minutes of the test.  Significant cardiac arrhythmias were not evident.      He reports that he was seeing ENT for hypoparathyroidism and they were discussing his daytime fatigue which led him to getting a sleep study.  His wife is present with him and reports that he snores and she is also concerned because she has noticed him stop breathing during his sleep.  She states that he seems to be very fatigued/sleepy especially early in the evening.  He reports that he is so busy with work that he does not nap during the day but on the weekends he will take naps on Saturday and Sunday.  His normal bedtime hours are from 11 PM until 530-5:45 AM.  He denies ever having any problems with initially going to sleep but does report that he is awakening 2-3 times during the night to use the bathroom.  He denies ever having any symptoms of restless leg syndrome.  He does report that he is fatigued throughout most days.  And[place] score is 16/24.  He reports that he is due to have his right knee replaced on December 3.  He denies any other medical changes.  Reports that his weight has consistently been around 265 pounds.  His blood pressure is controlled today.      Sleepiness Scale:

## 2024-11-27 NOTE — H&P
cleared preoperatively.  I counseled the patient once again about the risks of infection, DVT formation, expected time of hospitalization, anticipated recovery time as well as rehab needs and expectations for recovery.  The patient would like to proceed and we will do so as planned. The patient was provided with pain medications as well as DVT prophylaxis to have on hand postoperatively at the time of discharge from hospital. All pertinent questions asked by the patient were answered.    Signed:  AUSTIN Rutledge 11/27/2024

## 2024-12-02 ENCOUNTER — ANESTHESIA EVENT (OUTPATIENT)
Dept: SURGERY | Age: 69
End: 2024-12-02
Payer: MEDICARE

## 2024-12-02 DIAGNOSIS — M17.11 PRIMARY OSTEOARTHRITIS OF RIGHT KNEE: Primary | ICD-10-CM

## 2024-12-02 RX ORDER — OXYCODONE HYDROCHLORIDE 5 MG/1
5-10 TABLET ORAL EVERY 4 HOURS PRN
Qty: 60 TABLET | Refills: 0 | Status: SHIPPED | OUTPATIENT
Start: 2024-12-02 | End: 2024-12-09

## 2024-12-02 RX ORDER — ONDANSETRON 4 MG/1
4 TABLET, FILM COATED ORAL EVERY 6 HOURS PRN
Qty: 30 TABLET | Refills: 0 | Status: SHIPPED | OUTPATIENT
Start: 2024-12-02

## 2024-12-02 RX ORDER — ASPIRIN 81 MG/1
81 TABLET ORAL 2 TIMES DAILY
Qty: 60 TABLET | Refills: 0 | Status: SHIPPED | OUTPATIENT
Start: 2024-12-02

## 2024-12-02 RX ORDER — METHOCARBAMOL 750 MG/1
TABLET, FILM COATED ORAL
Qty: 40 TABLET | Refills: 0 | Status: SHIPPED | OUTPATIENT
Start: 2024-12-02

## 2024-12-02 RX ORDER — CELECOXIB 200 MG/1
200 CAPSULE ORAL DAILY
Qty: 30 CAPSULE | Refills: 0 | Status: SHIPPED | OUTPATIENT
Start: 2024-12-02

## 2024-12-03 ENCOUNTER — ANESTHESIA (OUTPATIENT)
Dept: SURGERY | Age: 69
End: 2024-12-03
Payer: MEDICARE

## 2024-12-03 ENCOUNTER — HOSPITAL ENCOUNTER (OUTPATIENT)
Age: 69
Discharge: HOME HEALTH | End: 2024-12-03
Attending: ORTHOPAEDIC SURGERY | Admitting: ORTHOPAEDIC SURGERY
Payer: MEDICARE

## 2024-12-03 VITALS
DIASTOLIC BLOOD PRESSURE: 87 MMHG | TEMPERATURE: 98.3 F | BODY MASS INDEX: 34.1 KG/M2 | RESPIRATION RATE: 16 BRPM | OXYGEN SATURATION: 95 % | HEIGHT: 74 IN | WEIGHT: 265.7 LBS | SYSTOLIC BLOOD PRESSURE: 135 MMHG | HEART RATE: 95 BPM

## 2024-12-03 PROBLEM — M17.11 ARTHRITIS OF RIGHT KNEE: Status: ACTIVE | Noted: 2024-12-03

## 2024-12-03 PROCEDURE — 6360000002 HC RX W HCPCS: Performed by: ORTHOPAEDIC SURGERY

## 2024-12-03 PROCEDURE — 7100000001 HC PACU RECOVERY - ADDTL 15 MIN: Performed by: ORTHOPAEDIC SURGERY

## 2024-12-03 PROCEDURE — 6360000002 HC RX W HCPCS: Performed by: ANESTHESIOLOGY

## 2024-12-03 PROCEDURE — 2709999900 HC NON-CHARGEABLE SUPPLY: Performed by: ORTHOPAEDIC SURGERY

## 2024-12-03 PROCEDURE — 64447 NJX AA&/STRD FEMORAL NRV IMG: CPT | Performed by: ANESTHESIOLOGY

## 2024-12-03 PROCEDURE — C1713 ANCHOR/SCREW BN/BN,TIS/BN: HCPCS | Performed by: ORTHOPAEDIC SURGERY

## 2024-12-03 PROCEDURE — 6370000000 HC RX 637 (ALT 250 FOR IP): Performed by: ANESTHESIOLOGY

## 2024-12-03 PROCEDURE — 97535 SELF CARE MNGMENT TRAINING: CPT

## 2024-12-03 PROCEDURE — 7100000000 HC PACU RECOVERY - FIRST 15 MIN: Performed by: ORTHOPAEDIC SURGERY

## 2024-12-03 PROCEDURE — 2720000010 HC SURG SUPPLY STERILE: Performed by: ORTHOPAEDIC SURGERY

## 2024-12-03 PROCEDURE — C1776 JOINT DEVICE (IMPLANTABLE): HCPCS | Performed by: ORTHOPAEDIC SURGERY

## 2024-12-03 PROCEDURE — 97165 OT EVAL LOW COMPLEX 30 MIN: CPT

## 2024-12-03 PROCEDURE — 94760 N-INVAS EAR/PLS OXIMETRY 1: CPT

## 2024-12-03 PROCEDURE — 2580000003 HC RX 258: Performed by: PHYSICIAN ASSISTANT

## 2024-12-03 PROCEDURE — 3600000005 HC SURGERY LEVEL 5 BASE: Performed by: ORTHOPAEDIC SURGERY

## 2024-12-03 PROCEDURE — 6370000000 HC RX 637 (ALT 250 FOR IP): Performed by: PHYSICIAN ASSISTANT

## 2024-12-03 PROCEDURE — 6360000002 HC RX W HCPCS: Performed by: NURSE ANESTHETIST, CERTIFIED REGISTERED

## 2024-12-03 PROCEDURE — 97161 PT EVAL LOW COMPLEX 20 MIN: CPT

## 2024-12-03 PROCEDURE — 2500000003 HC RX 250 WO HCPCS: Performed by: ORTHOPAEDIC SURGERY

## 2024-12-03 PROCEDURE — 97530 THERAPEUTIC ACTIVITIES: CPT

## 2024-12-03 PROCEDURE — 3700000000 HC ANESTHESIA ATTENDED CARE: Performed by: ORTHOPAEDIC SURGERY

## 2024-12-03 PROCEDURE — 3700000001 HC ADD 15 MINUTES (ANESTHESIA): Performed by: ORTHOPAEDIC SURGERY

## 2024-12-03 PROCEDURE — 2580000003 HC RX 258: Performed by: NURSE ANESTHETIST, CERTIFIED REGISTERED

## 2024-12-03 PROCEDURE — 2580000003 HC RX 258: Performed by: ORTHOPAEDIC SURGERY

## 2024-12-03 PROCEDURE — 2500000003 HC RX 250 WO HCPCS: Performed by: NURSE ANESTHETIST, CERTIFIED REGISTERED

## 2024-12-03 PROCEDURE — 6360000002 HC RX W HCPCS: Performed by: PHYSICIAN ASSISTANT

## 2024-12-03 PROCEDURE — 3600000015 HC SURGERY LEVEL 5 ADDTL 15MIN: Performed by: ORTHOPAEDIC SURGERY

## 2024-12-03 PROCEDURE — 2580000003 HC RX 258: Performed by: ANESTHESIOLOGY

## 2024-12-03 DEVICE — DEPUY CMW 2 FAST SET BONE CEMENT 20G: Type: IMPLANTABLE DEVICE | Site: PATELLA | Status: FUNCTIONAL

## 2024-12-03 DEVICE — ATTUNE KNEE SYSTEM TIBIAL INSERT FIXED BEARING MEDIAL STABILIZED RIGHT AOX 7, 6MM
Type: IMPLANTABLE DEVICE | Site: KNEE | Status: FUNCTIONAL
Brand: ATTUNE

## 2024-12-03 DEVICE — KNEE K2 TOT HEMI ADV CMTLS IMPL CAPPED SYNTHES: Type: IMPLANTABLE DEVICE | Status: FUNCTIONAL

## 2024-12-03 DEVICE — ATTUNE PATELLA MEDIALIZED ANATOMIC 41MM CEMENTED AOX
Type: IMPLANTABLE DEVICE | Site: PATELLA | Status: FUNCTIONAL
Brand: ATTUNE

## 2024-12-03 DEVICE — ATTUNE KNEE SYSTEM FEMORAL POROCOAT CRUCIATE RETAINING SIZE 7 RIGHT CEMENTLESS
Type: IMPLANTABLE DEVICE | Site: KNEE | Status: FUNCTIONAL
Brand: ATTUNE

## 2024-12-03 DEVICE — ATTUNE KNEE SYSTEM TIBIAL BASE AFFIXIUM FIXED BEARING SIZE 7
Type: IMPLANTABLE DEVICE | Site: KNEE | Status: FUNCTIONAL
Brand: ATTUNE AFFIXIUM

## 2024-12-03 RX ORDER — SODIUM CHLORIDE 0.9 % (FLUSH) 0.9 %
5-40 SYRINGE (ML) INJECTION EVERY 12 HOURS SCHEDULED
Status: DISCONTINUED | OUTPATIENT
Start: 2024-12-03 | End: 2024-12-03 | Stop reason: HOSPADM

## 2024-12-03 RX ORDER — HYDROMORPHONE HYDROCHLORIDE 1 MG/ML
1 INJECTION, SOLUTION INTRAMUSCULAR; INTRAVENOUS; SUBCUTANEOUS
Status: DISCONTINUED | OUTPATIENT
Start: 2024-12-03 | End: 2024-12-03 | Stop reason: HOSPADM

## 2024-12-03 RX ORDER — DIPHENHYDRAMINE HYDROCHLORIDE 50 MG/ML
25 INJECTION INTRAMUSCULAR; INTRAVENOUS EVERY 6 HOURS PRN
Status: DISCONTINUED | OUTPATIENT
Start: 2024-12-03 | End: 2024-12-03 | Stop reason: HOSPADM

## 2024-12-03 RX ORDER — SODIUM CHLORIDE 0.9 % (FLUSH) 0.9 %
5-40 SYRINGE (ML) INJECTION PRN
Status: DISCONTINUED | OUTPATIENT
Start: 2024-12-03 | End: 2024-12-03 | Stop reason: HOSPADM

## 2024-12-03 RX ORDER — OXYCODONE HYDROCHLORIDE 5 MG/1
5 TABLET ORAL
Status: DISCONTINUED | OUTPATIENT
Start: 2024-12-03 | End: 2024-12-03 | Stop reason: HOSPADM

## 2024-12-03 RX ORDER — ACETAMINOPHEN 500 MG
1000 TABLET ORAL ONCE
Status: DISCONTINUED | OUTPATIENT
Start: 2024-12-03 | End: 2024-12-03 | Stop reason: SDUPTHER

## 2024-12-03 RX ORDER — DEXAMETHASONE SODIUM PHOSPHATE 4 MG/ML
INJECTION, SOLUTION INTRA-ARTICULAR; INTRALESIONAL; INTRAMUSCULAR; INTRAVENOUS; SOFT TISSUE
Status: COMPLETED | OUTPATIENT
Start: 2024-12-03 | End: 2024-12-03

## 2024-12-03 RX ORDER — METHOCARBAMOL 750 MG/1
750 TABLET, FILM COATED ORAL 4 TIMES DAILY PRN
Status: DISCONTINUED | OUTPATIENT
Start: 2024-12-03 | End: 2024-12-03 | Stop reason: HOSPADM

## 2024-12-03 RX ORDER — GLYCOPYRROLATE 0.2 MG/ML
INJECTION INTRAMUSCULAR; INTRAVENOUS
Status: DISCONTINUED | OUTPATIENT
Start: 2024-12-03 | End: 2024-12-03 | Stop reason: SDUPTHER

## 2024-12-03 RX ORDER — DEXAMETHASONE SODIUM PHOSPHATE 10 MG/ML
INJECTION INTRAMUSCULAR; INTRAVENOUS
Status: DISCONTINUED | OUTPATIENT
Start: 2024-12-03 | End: 2024-12-03 | Stop reason: SDUPTHER

## 2024-12-03 RX ORDER — NALOXONE HYDROCHLORIDE 0.4 MG/ML
INJECTION, SOLUTION INTRAMUSCULAR; INTRAVENOUS; SUBCUTANEOUS PRN
Status: DISCONTINUED | OUTPATIENT
Start: 2024-12-03 | End: 2024-12-03 | Stop reason: HOSPADM

## 2024-12-03 RX ORDER — MAGNESIUM HYDROXIDE/ALUMINUM HYDROXICE/SIMETHICONE 120; 1200; 1200 MG/30ML; MG/30ML; MG/30ML
15 SUSPENSION ORAL EVERY 6 HOURS PRN
Status: DISCONTINUED | OUTPATIENT
Start: 2024-12-03 | End: 2024-12-03 | Stop reason: HOSPADM

## 2024-12-03 RX ORDER — ROPIVACAINE HYDROCHLORIDE 2 MG/ML
INJECTION, SOLUTION EPIDURAL; INFILTRATION; PERINEURAL PRN
Status: DISCONTINUED | OUTPATIENT
Start: 2024-12-03 | End: 2024-12-03 | Stop reason: HOSPADM

## 2024-12-03 RX ORDER — LISINOPRIL 5 MG/1
10 TABLET ORAL DAILY
Status: DISCONTINUED | OUTPATIENT
Start: 2024-12-04 | End: 2024-12-03 | Stop reason: HOSPADM

## 2024-12-03 RX ORDER — DIPHENHYDRAMINE HCL 25 MG
25 CAPSULE ORAL EVERY 6 HOURS PRN
Status: DISCONTINUED | OUTPATIENT
Start: 2024-12-03 | End: 2024-12-03 | Stop reason: HOSPADM

## 2024-12-03 RX ORDER — NALOXONE HYDROCHLORIDE 0.4 MG/ML
0.4 INJECTION, SOLUTION INTRAMUSCULAR; INTRAVENOUS; SUBCUTANEOUS PRN
Status: DISCONTINUED | OUTPATIENT
Start: 2024-12-03 | End: 2024-12-03 | Stop reason: HOSPADM

## 2024-12-03 RX ORDER — PROMETHAZINE HYDROCHLORIDE 25 MG/1
25 TABLET ORAL EVERY 6 HOURS PRN
Status: DISCONTINUED | OUTPATIENT
Start: 2024-12-03 | End: 2024-12-03 | Stop reason: HOSPADM

## 2024-12-03 RX ORDER — OXYCODONE HYDROCHLORIDE 5 MG/1
10 TABLET ORAL EVERY 4 HOURS PRN
Status: DISCONTINUED | OUTPATIENT
Start: 2024-12-03 | End: 2024-12-03 | Stop reason: HOSPADM

## 2024-12-03 RX ORDER — TRANEXAMIC ACID 100 MG/ML
INJECTION, SOLUTION INTRAVENOUS
Status: DISCONTINUED | OUTPATIENT
Start: 2024-12-03 | End: 2024-12-03 | Stop reason: SDUPTHER

## 2024-12-03 RX ORDER — KETAMINE HCL IN NACL, ISO-OSM 20 MG/2 ML
SYRINGE (ML) INJECTION
Status: DISCONTINUED | OUTPATIENT
Start: 2024-12-03 | End: 2024-12-03 | Stop reason: SDUPTHER

## 2024-12-03 RX ORDER — SODIUM CHLORIDE 9 MG/ML
INJECTION, SOLUTION INTRAVENOUS PRN
Status: DISCONTINUED | OUTPATIENT
Start: 2024-12-03 | End: 2024-12-03 | Stop reason: HOSPADM

## 2024-12-03 RX ORDER — LIDOCAINE HYDROCHLORIDE 20 MG/ML
INJECTION, SOLUTION EPIDURAL; INFILTRATION; INTRACAUDAL; PERINEURAL
Status: DISCONTINUED | OUTPATIENT
Start: 2024-12-03 | End: 2024-12-03 | Stop reason: SDUPTHER

## 2024-12-03 RX ORDER — ACETAMINOPHEN 500 MG
1000 TABLET ORAL ONCE
Status: COMPLETED | OUTPATIENT
Start: 2024-12-03 | End: 2024-12-03

## 2024-12-03 RX ORDER — KETOROLAC TROMETHAMINE 30 MG/ML
INJECTION, SOLUTION INTRAMUSCULAR; INTRAVENOUS PRN
Status: DISCONTINUED | OUTPATIENT
Start: 2024-12-03 | End: 2024-12-03 | Stop reason: HOSPADM

## 2024-12-03 RX ORDER — ACETAMINOPHEN 325 MG/1
650 TABLET ORAL EVERY 6 HOURS
Status: DISCONTINUED | OUTPATIENT
Start: 2024-12-03 | End: 2024-12-03 | Stop reason: HOSPADM

## 2024-12-03 RX ORDER — FENTANYL CITRATE 50 UG/ML
100 INJECTION, SOLUTION INTRAMUSCULAR; INTRAVENOUS
Status: COMPLETED | OUTPATIENT
Start: 2024-12-03 | End: 2024-12-03

## 2024-12-03 RX ORDER — HYDROMORPHONE HYDROCHLORIDE 1 MG/ML
0.5 INJECTION, SOLUTION INTRAMUSCULAR; INTRAVENOUS; SUBCUTANEOUS
Status: DISCONTINUED | OUTPATIENT
Start: 2024-12-03 | End: 2024-12-03 | Stop reason: HOSPADM

## 2024-12-03 RX ORDER — EPHEDRINE SULFATE 5 MG/ML
INJECTION INTRAVENOUS
Status: DISCONTINUED | OUTPATIENT
Start: 2024-12-03 | End: 2024-12-03 | Stop reason: SDUPTHER

## 2024-12-03 RX ORDER — ROPIVACAINE HYDROCHLORIDE 2 MG/ML
INJECTION, SOLUTION EPIDURAL; INFILTRATION; PERINEURAL
Status: COMPLETED | OUTPATIENT
Start: 2024-12-03 | End: 2024-12-03

## 2024-12-03 RX ORDER — LIDOCAINE HYDROCHLORIDE 10 MG/ML
1 INJECTION, SOLUTION INFILTRATION; PERINEURAL
Status: DISCONTINUED | OUTPATIENT
Start: 2024-12-03 | End: 2024-12-03 | Stop reason: HOSPADM

## 2024-12-03 RX ORDER — DEXAMETHASONE SODIUM PHOSPHATE 10 MG/ML
10 INJECTION INTRAMUSCULAR; INTRAVENOUS ONCE
Status: DISCONTINUED | OUTPATIENT
Start: 2024-12-04 | End: 2024-12-03 | Stop reason: HOSPADM

## 2024-12-03 RX ORDER — PROPOFOL 10 MG/ML
INJECTION, EMULSION INTRAVENOUS
Status: DISCONTINUED | OUTPATIENT
Start: 2024-12-03 | End: 2024-12-03 | Stop reason: SDUPTHER

## 2024-12-03 RX ORDER — PROCHLORPERAZINE EDISYLATE 5 MG/ML
5 INJECTION INTRAMUSCULAR; INTRAVENOUS
Status: DISCONTINUED | OUTPATIENT
Start: 2024-12-03 | End: 2024-12-03 | Stop reason: HOSPADM

## 2024-12-03 RX ORDER — ONDANSETRON 2 MG/ML
4 INJECTION INTRAMUSCULAR; INTRAVENOUS EVERY 6 HOURS PRN
Status: DISCONTINUED | OUTPATIENT
Start: 2024-12-03 | End: 2024-12-03 | Stop reason: HOSPADM

## 2024-12-03 RX ORDER — MIDAZOLAM HYDROCHLORIDE 1 MG/ML
INJECTION, SOLUTION INTRAMUSCULAR; INTRAVENOUS
Status: DISCONTINUED | OUTPATIENT
Start: 2024-12-03 | End: 2024-12-03 | Stop reason: SDUPTHER

## 2024-12-03 RX ORDER — SODIUM CHLORIDE 9 MG/ML
INJECTION, SOLUTION INTRAVENOUS CONTINUOUS
Status: DISCONTINUED | OUTPATIENT
Start: 2024-12-03 | End: 2024-12-03 | Stop reason: HOSPADM

## 2024-12-03 RX ORDER — HYDRALAZINE HYDROCHLORIDE 20 MG/ML
10 INJECTION INTRAMUSCULAR; INTRAVENOUS
Status: DISCONTINUED | OUTPATIENT
Start: 2024-12-03 | End: 2024-12-03 | Stop reason: HOSPADM

## 2024-12-03 RX ORDER — MIDAZOLAM HYDROCHLORIDE 2 MG/2ML
2 INJECTION, SOLUTION INTRAMUSCULAR; INTRAVENOUS
Status: COMPLETED | OUTPATIENT
Start: 2024-12-03 | End: 2024-12-03

## 2024-12-03 RX ORDER — ONDANSETRON 2 MG/ML
INJECTION INTRAMUSCULAR; INTRAVENOUS
Status: DISCONTINUED | OUTPATIENT
Start: 2024-12-03 | End: 2024-12-03 | Stop reason: SDUPTHER

## 2024-12-03 RX ORDER — ONDANSETRON 2 MG/ML
4 INJECTION INTRAMUSCULAR; INTRAVENOUS
Status: DISCONTINUED | OUTPATIENT
Start: 2024-12-03 | End: 2024-12-03 | Stop reason: HOSPADM

## 2024-12-03 RX ORDER — CELECOXIB 200 MG/1
200 CAPSULE ORAL DAILY
Status: DISCONTINUED | OUTPATIENT
Start: 2024-12-04 | End: 2024-12-03 | Stop reason: HOSPADM

## 2024-12-03 RX ORDER — SODIUM CHLORIDE, SODIUM LACTATE, POTASSIUM CHLORIDE, CALCIUM CHLORIDE 600; 310; 30; 20 MG/100ML; MG/100ML; MG/100ML; MG/100ML
INJECTION, SOLUTION INTRAVENOUS CONTINUOUS
Status: DISCONTINUED | OUTPATIENT
Start: 2024-12-03 | End: 2024-12-03 | Stop reason: HOSPADM

## 2024-12-03 RX ORDER — LABETALOL HYDROCHLORIDE 5 MG/ML
10 INJECTION, SOLUTION INTRAVENOUS
Status: DISCONTINUED | OUTPATIENT
Start: 2024-12-03 | End: 2024-12-03 | Stop reason: HOSPADM

## 2024-12-03 RX ORDER — AMLODIPINE BESYLATE 5 MG/1
5 TABLET ORAL DAILY
Status: DISCONTINUED | OUTPATIENT
Start: 2024-12-04 | End: 2024-12-03 | Stop reason: HOSPADM

## 2024-12-03 RX ORDER — ASPIRIN 81 MG/1
81 TABLET ORAL 2 TIMES DAILY
Status: DISCONTINUED | OUTPATIENT
Start: 2024-12-03 | End: 2024-12-03 | Stop reason: HOSPADM

## 2024-12-03 RX ORDER — SENNA AND DOCUSATE SODIUM 50; 8.6 MG/1; MG/1
1 TABLET, FILM COATED ORAL 2 TIMES DAILY
Status: DISCONTINUED | OUTPATIENT
Start: 2024-12-03 | End: 2024-12-03 | Stop reason: HOSPADM

## 2024-12-03 RX ORDER — OXYCODONE HYDROCHLORIDE 5 MG/1
5 TABLET ORAL EVERY 4 HOURS PRN
Status: DISCONTINUED | OUTPATIENT
Start: 2024-12-03 | End: 2024-12-03 | Stop reason: HOSPADM

## 2024-12-03 RX ORDER — AMLODIPINE AND BENAZEPRIL HYDROCHLORIDE 5; 10 MG/1; MG/1
1 CAPSULE ORAL DAILY
Status: DISCONTINUED | OUTPATIENT
Start: 2024-12-03 | End: 2024-12-03

## 2024-12-03 RX ADMIN — GLYCOPYRROLATE 0.15 MG: 0.2 INJECTION INTRAMUSCULAR; INTRAVENOUS at 09:17

## 2024-12-03 RX ADMIN — FENTANYL CITRATE 50 MCG: 50 INJECTION INTRAMUSCULAR; INTRAVENOUS at 08:53

## 2024-12-03 RX ADMIN — EPHEDRINE SULFATE 10 MG: 5 INJECTION INTRAVENOUS at 09:19

## 2024-12-03 RX ADMIN — LIDOCAINE HYDROCHLORIDE 50 MG: 20 INJECTION, SOLUTION EPIDURAL; INFILTRATION; INTRACAUDAL; PERINEURAL at 09:17

## 2024-12-03 RX ADMIN — CEFAZOLIN 1000 MG: 2 INJECTION, POWDER, FOR SOLUTION INTRAMUSCULAR; INTRAVENOUS at 09:41

## 2024-12-03 RX ADMIN — OXYCODONE 10 MG: 5 TABLET ORAL at 13:38

## 2024-12-03 RX ADMIN — SODIUM CHLORIDE, SODIUM LACTATE, POTASSIUM CHLORIDE, AND CALCIUM CHLORIDE: 600; 310; 30; 20 INJECTION, SOLUTION INTRAVENOUS at 08:56

## 2024-12-03 RX ADMIN — MIDAZOLAM 2 MG: 1 INJECTION INTRAMUSCULAR; INTRAVENOUS at 08:53

## 2024-12-03 RX ADMIN — TRANEXAMIC ACID 1000 MG: 100 INJECTION, SOLUTION INTRAVENOUS at 09:30

## 2024-12-03 RX ADMIN — ACETAMINOPHEN 1000 MG: 500 TABLET, FILM COATED ORAL at 07:34

## 2024-12-03 RX ADMIN — EPHEDRINE SULFATE 5 MG: 5 INJECTION INTRAVENOUS at 10:01

## 2024-12-03 RX ADMIN — DEXAMETHASONE SODIUM PHOSPHATE 5 MG: 4 INJECTION INTRA-ARTICULAR; INTRALESIONAL; INTRAMUSCULAR; INTRAVENOUS; SOFT TISSUE at 08:52

## 2024-12-03 RX ADMIN — DEXAMETHASONE SODIUM PHOSPHATE 10 MG: 10 INJECTION INTRAMUSCULAR; INTRAVENOUS at 09:13

## 2024-12-03 RX ADMIN — ROPIVACAINE HYDROCHLORIDE 20 ML: 2 INJECTION, SOLUTION EPIDURAL; INFILTRATION at 08:52

## 2024-12-03 RX ADMIN — Medication 20 MG: at 09:17

## 2024-12-03 RX ADMIN — PROPOFOL 50 MCG/KG/MIN: 10 INJECTION, EMULSION INTRAVENOUS at 09:17

## 2024-12-03 RX ADMIN — EPHEDRINE SULFATE 10 MG: 5 INJECTION INTRAVENOUS at 09:43

## 2024-12-03 RX ADMIN — CEFAZOLIN 2000 MG: 2 INJECTION, POWDER, FOR SOLUTION INTRAMUSCULAR; INTRAVENOUS at 09:28

## 2024-12-03 RX ADMIN — ONDANSETRON 4 MG: 2 INJECTION, SOLUTION INTRAMUSCULAR; INTRAVENOUS at 09:12

## 2024-12-03 RX ADMIN — MIDAZOLAM 2 MG: 1 INJECTION INTRAMUSCULAR; INTRAVENOUS at 09:09

## 2024-12-03 RX ADMIN — PHENYLEPHRINE HYDROCHLORIDE 100 MCG: 10 INJECTION INTRAVENOUS at 09:19

## 2024-12-03 RX ADMIN — MEPIVACAINE HYDROCHLORIDE 60 MG: 20 INJECTION, SOLUTION EPIDURAL; INFILTRATION at 09:13

## 2024-12-03 ASSESSMENT — PAIN SCALES - GENERAL
PAINLEVEL_OUTOF10: 4
PAINLEVEL_OUTOF10: 6

## 2024-12-03 ASSESSMENT — PAIN DESCRIPTION - DESCRIPTORS
DESCRIPTORS: ACHING
DESCRIPTORS: DISCOMFORT;DULL;PRESSURE

## 2024-12-03 ASSESSMENT — PAIN - FUNCTIONAL ASSESSMENT
PAIN_FUNCTIONAL_ASSESSMENT: 0-10
PAIN_FUNCTIONAL_ASSESSMENT: PREVENTS OR INTERFERES SOME ACTIVE ACTIVITIES AND ADLS

## 2024-12-03 ASSESSMENT — PAIN DESCRIPTION - LOCATION: LOCATION: KNEE

## 2024-12-03 ASSESSMENT — PAIN DESCRIPTION - ORIENTATION: ORIENTATION: RIGHT

## 2024-12-03 NOTE — ANESTHESIA PROCEDURE NOTES
Spinal Block    Patient location during procedure: OR  End time: 12/3/2024 9:13 AM  Reason for block: primary anesthetic and at surgeon's request  Staffing  Performed: anesthesiologist   Anesthesiologist: Joni Kirkland DO  Performed by: Joni Kirkland DO  Authorized by: Joni Kirkland DO    Spinal Block  Patient position: sitting  Prep: ChloraPrep  Patient monitoring: cardiac monitor, continuous pulse ox, continuous capnometry, frequent blood pressure checks and oxygen  Approach: midline  Location: L3/L4  Provider prep: mask and sterile gloves  Local infiltration: lidocaine  Needle  Needle type: Jojo   Needle gauge: 25 G  Needle length: 3.5 in  Assessment  Swirl obtained: Yes  CSF: clear  Attempts: 1  Hemodynamics: stable  Additional Notes  Uneventful spinal placement  Preanesthetic Checklist  Completed: patient identified, IV checked, site marked, risks and benefits discussed, surgical/procedural consents, equipment checked, pre-op evaluation, timeout performed, anesthesia consent given, oxygen available and monitors applied/VS acknowledged

## 2024-12-03 NOTE — DISCHARGE INSTRUCTIONS
[facility]      Patient Discharge Instructions    Vijay Godinez/[id number] : 1955    Admitted 12/3/2024 Discharged: 12/3/2024       IF YOU HAVE ANY PROBLEMS ONCE YOU ARE AT HOME CALL THE FOLLOWING NUMBERS:   Main office number: [phone]      Medications    The medications you are to continue on are listed on the medication reconciliation sheet.   Narcotic pain medications as well as supplemental iron can cause constipation. If this occurs try stopping the narcotic pain medication and/or the iron.   It is important that you take the medication exactly as they are prescribed.  Medications which increase your risk of blood clots are listed to stop for 5 weeks after surgery as well as medications or supplements which increase your risk of bleeding complications.   Keep your medication in the bottles provided by the pharmacist and keep a list of the medication names, dosages, and times to be taken in your wallet.   Do not take other medications without consulting your doctor.       Important Information    Do NOT smoke as this will greatly increase your risk of infection!    Resume your prehospital diet. If you have excessive nausea or vomitting call your doctor's office     Leg swelling and warmth is normal for 6 months after surgery. If you experience swelling in your leg elevate you leg while laying down with your toes above your heart. If you have sudden onset severe swelling with leg pain call our office. Use Zi Hose stockings until we see you in the office for your follow up appointment.    The stitches deep inside take approximately 6 months to dissolve. There will be sharp shooting, stinging and burning pain. This is normal and will resolve between 3-6 months after surgery.     Difficulty sleeping is normal following total Knee and Hip replacement. You may try melatonin, an over-the-counter sleep aid or benadryl to help with sleep. Most patients will resume sleeping through the

## 2024-12-03 NOTE — CARE COORDINATION
CM note:    Chart reviewed for updates. CM met with pt at bedside, introduced role, confirmed demographics and discussed d/c planning. Patient is a 69 y.o. year old male admitted for Right TKA . Patient plans to return home on discharge. Order received to arrange home health. Patient without preference towards agency. Referral sent to [facility] by Viktor. Patient denies any equipment needs as patient has a walker. Patient lives with spouse in a house. She is insured and has a PCP that he sees every 6 months. Pt is scheduled to discharge today. Spouse to transport. No further CM needs identified. CM will remain accessible for consult incase additional CM needs arise prior d/c.     12/03/24 8750   Service Assessment   Patient Orientation Alert and Oriented   Cognition Alert   History Provided By Patient   Primary Caregiver Self   Accompanied By/Relationship Spouse at bedside   Support Systems Spouse/Significant Other   Patient's Healthcare Decision Maker is: Legal Next of Kin   PCP Verified by CM Yes   Last Visit to PCP Within last 6 months   Prior Functional Level Independent in ADLs/IADLs   Current Functional Level Independent in ADLs/IADLs   Financial Resources Medicare   Community Resources None   Social/Functional History   ADL Assistance Independent   Ambulation Assistance Independent   Occupation Full time employment   Discharge Planning   Living Arrangements Spouse/Significant Other   Current Services Prior To Admission Durable Medical Equipment   Current DME Prior to Arrival Walker   Type of Home Care Services PT   Patient expects to be discharged to: House   Services At/After Discharge   Transition of Care Consult (CM Consult) Home Health   Internal Home Health Yes   Services At/After Discharge Home Health;PT   [place] Resource Information Provided? No   Mode of Transport at Discharge Other (see comment)  (Spouse)   Confirm Follow Up Transport Family   Condition of Participation: Discharge

## 2024-12-03 NOTE — FLOWSHEET NOTE
12/03/24 1533   AVS Reviewed   AVS & discharge instructions reviewed with patient and/or representative? Yes   Reviewed instructions with Patient   Level of Understanding Questions answered     IV removed without complications.

## 2024-12-03 NOTE — ACP (ADVANCE CARE PLANNING)
Advance Care Planning   General Advance Care Planning (ACP) Conversation    Date of Conversation: 9/10/2024  Conducted with: Patient with Decision Making Capacity  Other persons present: None    Healthcare Decision Maker:    Primary Decision Maker: Sonia Godinez - [facility] - [phone]        Length of Voluntary ACP Conversation in minutes:  <16 minutes (Non-Billable)    Tika Moser

## 2024-12-03 NOTE — RT PROTOCOL NOTE
oxygen with humidity (except for those oxygen delivery devices that do not require humidity, i.e. venturi masks and non-rebreather masks) as ordered by the physician to achieve the prescribed oxygen saturation.  In the event that no saturation is specified, a saturation of 90% will be maintained.   Patients, who are found to have a SaO2 of 88% or less, may be started on supplemental oxygen as described above.  Patients admitted with cardiac problems/disease shall be maintained at 92% per Chest Committee recommendation.  The patient will be informed of the \"no smoking policy\" and instructed in the proper use of oxygen therapy.  Once desired oxygen saturation has been achieved, the RCP will document FIO2 and oxygen saturation in the respiratory section of the patient's EMR.     VII. Maintenance:   30-second oxygen saturation check will be taken to maintain the saturation ordered by the physician each day.  Patients will be assessed each shift and as needed by pulse oximetry to determine if oxygen needs to be decreased, increased or discontinued.  If changes in FIO2 are indicated, all changes will be documented in the respiratory section of the patient's EMR.   If no changes in FIO2 are required, the patient's oxygen flow rate and saturation will be recorded in the respiratory section of the patient's EMR.  Per [facility], patients who are receiving oxygen therapy but are not on oxygen at home, should be weaned off oxygen as soon as possible or when anticipated discharge becomes evident.  Oxygen will be discontinued after oxygen saturation has been maintained for 24 hours on room air and documented in the patient's EMR.  Patients on the Inpatient Rehabilitation area on 9th floor will be exempt from having their oxygen discontinued per protocol. Oxygen may be weaned but will be changed to prn to meet the needs of the patient when exercising and participating in therapy.  The goal of oxygen therapy is to maintain

## 2024-12-03 NOTE — PERIOP NOTE
TRANSFER - OUT REPORT:    Verbal report given to Emiliana Kirk RN on Vijay Godinez  being transferred to [facility] for routine progression of patient care       Report consisted of patient's Situation, Background, Assessment and   Recommendations(SBAR).     Information from the following report(s) Nurse Handoff Report was reviewed with the receiving nurse.    Lines:   Peripheral IV 12/03/24 Left;Posterior Hand (Active)   Site Assessment Clean, dry & intact 12/03/24 1120   Line Status Infusing 12/03/24 1120   Phlebitis Assessment No symptoms 12/03/24 1120   Infiltration Assessment 0 12/03/24 1120   Dressing Status Clean, dry & intact 12/03/24 1120   Dressing Type Transparent 12/03/24 1120        Opportunity for questions and clarification was provided.      Patient transported with:

## 2024-12-03 NOTE — OP NOTE
Sponge count and needle counts were correct.  Vijay Godinez left the operating room     Implants:   Implant Name Type Inv. Item Serial No.  Lot No. LRB No. Used Action   CEMENT BNE 20GM HALF DOSE PMMA VISC RADPQ FAST - [id number]  CEMENT BNE 20GM HALF DOSE PMMA VISC RADPQ FAST  [facility] [employer] [facility] [id number] Right 1 Implanted   COMPONENT PAT [id number] KNEE POLY NATALYA MEDIALIZED ALMA ATTUNE - [id number]  COMPONENT PAT [id number] KNEE POLY NATALYA MEDIALIZED ALMA ATTUNE  [facility] [employer] [facility] [id number] Right 1 Implanted   ATTUNE FEM POR CR RT SZ 7 - [id number]  ATTUNE FEM POR CR RT SZ 7  [facility] [employer] [facility] [id number] Right 1 Implanted   INSERT TIB FIX BEAR 7 6 MM RT MEDL KNEE STBL AOX ATTUNE - [id number]  INSERT TIB FIX BEAR 7 6 MM RT MEDL KNEE STBL AOX ATTUNE  [facility] [employer] [facility] [id number] Right 1 Implanted   BEARING TIB FIX 7 KNEE BASE POROUS ATTUNE AFFIXIUM - [id number]  BEARING TIB FIX 7 KNEE BASE POROUS ATTUNE AFFIXIUM  [facility] [employer] [facility] [id number] Right 1 Implanted     Signed By: RAMESH MAHARAJ MD

## 2024-12-03 NOTE — INTERVAL H&P NOTE
Update History & Physical    The patient's History and Physical of November 27, 24 was reviewed with the patient and I examined the patient. There was no change. The surgical site was confirmed by the patient and me.     Plan: The risks, benefits, expected outcome, and alternative to the recommended procedure have been discussed with the patient. Patient understands and wants to proceed with the procedure.     Electronically signed by AUSTIN Rutledge on 12/3/2024 at 7:09 AM

## 2024-12-03 NOTE — PROGRESS NOTES
4 Eyes Skin Assessment     NAME:  Vijay Godinez  YOB: 1955  MEDICAL RECORD NUMBER:  [id number]    The patient is being assessed for  Admission    I agree that at least one RN has performed a thorough Head to Toe Skin Assessment on the patient. ALL assessment sites listed below have been assessed.      Areas assessed by both nurses:    Head, Face, Ears, Shoulders, Back, Chest, Arms, Elbows, Hands, and Legs. Feet and Heels        Does the Patient have a Wound? No noted wound(s)       Sreekanth Prevention initiated by RN: Yes  Wound Care Orders initiated by RN: No    Pressure Injury (Stage 3,4, Unstageable, DTI, NWPT, and Complex wounds) if present, place Wound referral order by RN under : No    New Ostomies, if present place, Ostomy referral order under : No     Nurse 1 eSignature: Electronically signed by Emiliana Buchanan RN on 12/3/24 at 12:06 PM EST    **SHARE this note so that the co-signing nurse can place an eSignature**    Nurse 2 eSignature: Electronically signed by ARNIE HIGGINS RN on 12/3/24 at 12:08 PM EST   
Dr. Romero reviewed chart. No order received. Ok to proceed.    
OCCUPATIONAL THERAPY Initial Assessment, Daily Note, and PM      (Link to Caseload Tracking: OT Visit Days: 1  OT Orders   Time  OT Charge Capture  Rehab Caseload Tracker  Episode     Vijay Godinez is a 69 y.o. male   PRIMARY DIAGNOSIS: Osteoarthritis of right knee  Osteoarthritis of right knee [M17.11]  Arthritis of right knee [M17.11]  Procedure(s) (LRB):  KNEE TOTAL ARTHROPLASTY ROBOTIC VELYS-RIGHT SDD (Right)  Day of Surgery  Reason for Referral: Pain in Right Knee (M25.561)  Stiffness of Right Knee, Not elsewhere classified (M25.661)  Generalized Muscle Weakness (M62.81)  Other lack of cordination (R27.8)  Difficulty in walking, Not elsewhere classified (R26.2)  Other abnormalities of gait and mobility (R26.89)  Outpatient in a bed: Payor: MEDICARE / Plan: MEDICARE PART A AND B / Product Type: *No Product type* /     ASSESSMENT:     REHAB RECOMMENDATIONS:   Recommendation to date pending progress:  Setting:  No further skilled occupational therapy after discharge from hospital    Equipment:    Rolling Walker     ASSESSMENT:  Mr. Godinez is s/p right TKA and presents with decreased independence with functional mobility and activities of daily living as compared to baseline level of function and safety. Patient would benefit from skilled Occupational Therapy to maximize independence and safety with self-care task and functional mobility.   Patient  supine in bed contact guard assist supine to sit. He was assisted getting dressed. He stood with min and completed clothing management. He took steps OT recliner with contact guard assist. He sat and rested then ambulated to the bathroom and toileted with contact guard assist. He returned to recliner and was set up with his ice machine. His wife was present during eval. OT educated on OT plan of care, discharge planning, adl task performance, post op showering, resting joint position, ice machine, walker use and home safety.          [facility] AM-PAC™ 
TRANSFER - IN REPORT:    Verbal report received from MISHA Kirk on Vijay Godinez  being received from PACU for routine post-op      Report consisted of patient's Situation, Background, Assessment and   Recommendations(SBAR).     Information from the following report(s) Nurse Handoff Report was reviewed with the receiving nurse.    Opportunity for questions and clarification was provided.      Assessment completed upon patient's arrival to unit and care assumed.    
- Rolling  Receives Help From: Family  ADL Assistance: Independent  Homemaking Assistance: Independent  Ambulation Assistance: Independent  Transfer Assistance: Independent  Active : Yes  Occupation: Full time employment  Type of Occupation: sales,drives, climbs ladder occasionally    OBJECTIVE:     PAIN: VITAL SIGNS: LINES/DRAINS:   Pre Treatment:         Post Treatment: mild thigh pain Vitals        Oxygen    None    RESTRICTIONS/PRECAUTIONS:  Restrictions/Precautions: Weight Bearing  Right Lower Extremity Weight Bearing: Weight Bearing As Tolerated        Restrictions/Precautions: Weight Bearing        LOWER EXTREMITY GROSS EVALUATION:   RIGHT LE   Within Functional Limits   Abnormal   Comments   Strength [] []  Can do SLR, limited by surgery   Range of Motion [] [] AROM RLE (degrees)  R Knee Flexion (0-145): 90  R Knee Extension (0): 5      LEFT LE Within Functional Limits   Abnormal   Comments   Strength [x] []     Range of Motion [x] []       UPPER EXTREMITY GROSS EVALUATION:     Within Functional Limits   Abnormal   Comments   Strength [] []    Range of Motion [] []      SENSATION  Sensation [x]       COGNITION/  PERCEPTION: Intact Impaired (Comments):   Orientation [x]     Vision [x]     Hearing [x]     Cognition  [x]       MOBILITY: I Mod I S SBA CGA Min Mod Max Total  NT x2 Comments:   Bed Mobility    Rolling [] [] [] [] [] [] [] [] [] [] []    Supine to Sit [] [] [] [] [] [] [] [] [] [] [] In chair   Scooting [] [] [] [] [] [] [] [] [] [] []    Sit to Supine [] [] [] [] [] [] [] [] [] [] []    Transfers    Sit to Stand [] [] [] [x] [] [] [] [] [] [] []    Bed to Chair [] [] [] [] [] [] [] [] [] [] []    Stand to Sit [] [] [] [x] [] [] [] [] [] [] []    Stand Pivot [] [] [] [] [] [] [] [] [] [] []    Toilet [] [] [] [] [] [] [] [] [] [] []     [] [] [] [] [] [] [] [] [] [] []    I=Independent, Mod I=Modified Independent, S=Supervision, SBA=Standby Assistance, CGA=Contact Guard Assistance,

## 2024-12-03 NOTE — DISCHARGE SUMMARY
[facility]  Total Joint Discharge Summary      Patient ID:  Vijay Godinez  [id number]  69 y.o.  1955    Admit date: 12/3/2024  Discharge date and time: 12/03/24   Admitting Physician: Rob Galeana MD  Surgeon: Same  Admission Diagnoses: Osteoarthritis of right knee [M17.11]  Arthritis of right knee [M17.11]  Discharge Diagnoses: Principal Problem:    Osteoarthritis of right knee  Active Problems:    Arthritis of right knee  Resolved Problems:    * No resolved hospital problems. *                              Perioperative Antibiotics: Ancef 1 to 3 g was given depending on patient's weight. If allergic to Ancef or due to other indications, patient was given Vancomycin/Gent per protocol      Hospital Medications given:   [START ON 12/4/2024] celecoxib, 200 mg, Daily  sodium chloride flush, 5-40 mL, 2 times per day  acetaminophen, 650 mg, Q6H  ceFAZolin (ANCEF) IV, 3,000 mg, Q8H  sennosides-docusate sodium, 1 tablet, BID  aspirin, 81 mg, BID  [START ON 12/4/2024] dexAMETHasone, 10 mg, Once  [START ON 12/4/2024] lisinopril, 10 mg, Daily  [START ON 12/4/2024] amLODIPine, 5 mg, Daily      sodium chloride, Last Rate: Stopped (12/03/24 1209)  sodium chloride      sodium chloride flush, 5-40 mL, PRN  sodium chloride, , PRN  oxyCODONE, 5 mg, Q4H PRN   Or  oxyCODONE, 10 mg, Q4H PRN  HYDROmorphone, 0.5 mg, Q3H PRN   Or  HYDROmorphone, 1 mg, Q3H PRN  promethazine, 25 mg, Q6H PRN   Or  ondansetron, 4 mg, Q6H PRN  aluminum & magnesium hydroxide-simethicone, 15 mL, Q6H PRN  diphenhydrAMINE, 25 mg, Q6H PRN   Or  diphenhydrAMINE, 25 mg, Q6H PRN  melatonin, 3 mg, Nightly PRN  sodium chloride, 1 spray, Q4H PRN  methocarbamol, 750 mg, 4x Daily PRN  naloxone, 0.4 mg, PRN        Discharge Medications given:  Current Discharge Medication List        CONTINUE these medications which have NOT CHANGED    Details   acetaminophen (TYLENOL) 500 MG tablet Take 1 tablet by mouth every 6 hours as needed for

## 2024-12-03 NOTE — ANESTHESIA POSTPROCEDURE EVALUATION
Department of Anesthesiology  Postprocedure Note    Patient: Vijay Godinez  MRN: [id number]  YOB: 1955  Date of evaluation: 12/3/2024    Procedure Summary       Date: 12/03/24 [place] [facility] [place] [facility] [place]    Anesthesia Start: 0857 Anesthesia Stop: 1108    Procedure: KNEE TOTAL ARTHROPLASTY ROBOTIC VELYS-RIGHT SDD (Right: Knee) Diagnosis:       Osteoarthritis of right knee      (Osteoarthritis of right knee [M17.11])    Surgeons: Rob Galeana MD Responsible Provider: Joni Kirkland DO    Anesthesia Type: Spinal ASA Status: 2            Anesthesia Type: Spinal    Olivia Phase I: Olivia Score: 9    Olivia Phase II:      Anesthesia Post Evaluation    Patient location during evaluation: PACU  Level of consciousness: awake and alert  Airway patency: patent  Nausea & Vomiting: no nausea  Cardiovascular status: hemodynamically stable  Respiratory status: acceptable  Hydration status: euvolemic  Pain management: satisfactory to patient    No notable events documented.

## 2024-12-03 NOTE — ANESTHESIA PROCEDURE NOTES
Peripheral Block    Patient location during procedure: pre-op  Reason for block: post-op pain management and at surgeon's request  Start time: 12/3/2024 8:52 AM  End time: 12/3/2024 8:55 AM  Staffing  Performed: anesthesiologist   Anesthesiologist: Joni Kirkland DO  Performed by: Joni Kirkland DO  Authorized by: Joni Kirkland DO    Preanesthetic Checklist  Completed: patient identified, IV checked, site marked, risks and benefits discussed, surgical/procedural consents, equipment checked, pre-op evaluation, timeout performed, anesthesia consent given, oxygen available and monitors applied/VS acknowledged  Peripheral Block   Patient position: supine  Prep: ChloraPrep  Provider prep: mask and sterile gloves  Patient monitoring: cardiac monitor, continuous pulse ox, frequent blood pressure checks, responsive to questions and oxygen  Block type: Femoral  Adductor canal  Laterality: right  Injection technique: single-shot  Guidance: ultrasound guided  Infiltration strength: 1 %  Dose: 3 mL    Needle   Needle type: insulated echogenic nerve stimulator needle   Needle gauge: 20 G  Needle localization: ultrasound guidance  Needle length: 10 cm  Assessment   Injection assessment: negative aspiration for heme, no paresthesia on injection, local visualized surrounding nerve on ultrasound and no intravascular symptoms  Paresthesia pain: none  Slow fractionated injection: yes  Hemodynamics: stable  Outcomes: uncomplicated and patient tolerated procedure well    Additional Notes  R/B/I discussed at length with pt including damage to nerve or muscle.    Needle inserted under real time Ultrasound guidance and placed in close proximity to nerve being blocked.  Ultrasound was used in real time to visualize spread of local anesthetic around nerve being blocked.  Nerve and surrounding structures appeared grossly normal.  A permanent Ultrasound image was stored in the chart  Medications

## 2024-12-03 NOTE — ANESTHESIA PRE PROCEDURE
opening: > = 3 FB   Dental: normal exam         Pulmonary:Negative Pulmonary ROS breath sounds clear to auscultation                             Cardiovascular:  Exercise tolerance: good (>4 METS)  (+) hypertension: mild        Rhythm: regular  Rate: normal                 ROS comment: Denies CP, SOB or changes in functional status     Neuro/Psych:   (+) psychiatric history: stable with treatment            GI/Hepatic/Renal:   (+) GERD: well controlled, PUD          Endo/Other:                      ROS comment: S/p parathyroidectomy Abdominal:             Vascular:          Other Findings:           Anesthesia Plan      spinal     ASA 2     (R/B/I of peripheral nerve block and spinal discussed at length with pt.  Expresses understanding, agreeable to proceed.  Questions answered)  Induction: intravenous.    MIPS: Postoperative opioids intended and Prophylactic antiemetics administered.  Anesthetic plan and risks discussed with patient and spouse.      Plan discussed with CRNA.          Post-op pain plan if not by surgeon: single peripheral nerve block          Joni Kirkland, DO   12/3/2024

## 2024-12-04 ENCOUNTER — TELEPHONE (OUTPATIENT)
Dept: ORTHOPEDICS UNIT | Age: 69
End: 2024-12-04

## 2024-12-04 NOTE — TELEPHONE ENCOUNTER
Called to follow up after TKA with SDD on 12/3/24.  No answer.  VM left with contact number for ortho navigator.

## 2024-12-30 ASSESSMENT — PAIN SCALES - GENERAL: PAINLEVEL_OUTOF10: 0

## 2024-12-30 NOTE — PROGRESS NOTES
Date:   Date:     Activity/Exercise Parameters Parameters Parameters   Straight leg raises 2 x 10      PROM X 6 minutes to R LE     Bridging 2 x 10 in hook lying     Sit to stand 2 x 10     Standing knee flexion stretch HEP                   Time spent with patient reviewing proper muscle recruitment and technique with exercises.     MANUAL THERAPY: (0 minutes): None today  Joint mobilization and Soft tissue mobilization was utilized and necessary because of the patient's restricted joint motion and restricted motion of soft tissue.   None today    MODALITIES: (0 minutes):        None today      HEP: As above; handouts given to patient for all exercises.    Treatment/Session Summary:    Treatment Assessment:   Patient did well with exercise performance. PT instructed patient to refrain from driving until his follow-up MD appointment. He is recovering very well from his R TKA.   Communication/Consultation:  Therapy Evaluation sent to referring provider  Equipment provided today:  HEP  Recommendations/Intent for next treatment session: Next visit will focus on improving R knee motion, R LE strength and overall mobility independence s/p R TKA.    >Total Treatment Billable Duration:  24 minutes of therex + 26 min for evaluation  Time In: 1235  Time Out: 1325    FAISAL TABOR PT         Charge Capture  Events  [employer] Portal  Appt Desk  Attendance Report     Future Appointments   Date Time Provider Department Center   1/7/2025  8:00 AM Vijay Borjas PTA [facility]   1/9/2025  4:30 PM Johnny Mccabe DO [id number] [facility] AMB   1/10/2025  8:00 AM Vijay Borjas PTA [facility]   1/13/2025  1:35 PM Rob Galeana MD [facility] AMB   1/14/2025  8:00 AM Faisal Tabor PT [facility]   1/20/2025  9:30 AM Nomi Collins MD [facility] DEP   1/21/2025 11:00 AM Faisal Tabor PT [facility]   1/28/2025 11:00 AM Faisal Tabor PT [facility]   1/28/2025  3:00 PM Isak Vale MD [facility] AM[facility]

## 2024-12-30 NOTE — THERAPY EVALUATION
Vijay Godinez  : 1955  Primary: Medicare Part A And B (Medicare)  [place] SUPP [facility]  [address]  [address]  [facility] [address]  Phone: [phone]  Fax: [phone] Plan Frequency: 1-2 visits per week for 6 weeks    Plan of Care/Certification Expiration Date: 02/10/25        Plan of Care/Certification Expiration Date:  Plan of Care/Certification Expiration Date: 02/10/25    Frequency/Duration: Plan Frequency: 1-2 visits per week for 6 weeks      Time In/Out:          PT Visit Info:    Progress Note Counter: 1      Visit Count:  1                OUTPATIENT PHYSICAL THERAPY:             Initial Assessment 2024               Episode (R TKA)         Treatment Diagnosis:     Difficulty in walking, not elsewhere classified  Stiffness of right knee, not elsewhere classified  Acute pain of right knee  Status post right knee replacement  Medical/Referring Diagnosis:    Status post right knee replacement [Z96.651]    Referring Physician:  Rob Galeana MD MD Orders:  PT Eval and Treat   Return MD Appt:  24  Date of Onset:  Onset Date: 24     Allergies:  Patient has no known allergies.  Restrictions/Precautions:    None      Medications Last Reviewed: 2024     SUBJECTIVE   History of Injury/Illness (Reason for Referral):  Reports that he had had R knee pain for several years, and underwent R TKA on 12/3/24. 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 Has to go up 2 steps to enter home at back entrance and 12 steps up to landing and 2 additional interior steps. He is doing stairs with reciprocal gait pattern. No problems sleeping due to knee pain. Experiences the most pain with prolonged sitting and going to stand.   Patient Stated Goal(s):  \"To be able to walk without the cane and get back to driving\"  Initial Pain Level:      0/10   Post Session Pain Level:

## 2024-12-31 ENCOUNTER — HOSPITAL ENCOUNTER (OUTPATIENT)
Dept: PHYSICAL THERAPY | Age: 69
Setting detail: RECURRING SERIES
Discharge: HOME | End: 2025-01-03
Payer: MEDICARE

## 2024-12-31 DIAGNOSIS — M25.561 ACUTE PAIN OF RIGHT KNEE: ICD-10-CM

## 2024-12-31 DIAGNOSIS — R26.2 DIFFICULTY IN WALKING, NOT ELSEWHERE CLASSIFIED: Primary | ICD-10-CM

## 2024-12-31 DIAGNOSIS — Z96.651 STATUS POST RIGHT KNEE REPLACEMENT: ICD-10-CM

## 2024-12-31 DIAGNOSIS — M25.661 STIFFNESS OF RIGHT KNEE, NOT ELSEWHERE CLASSIFIED: ICD-10-CM

## 2024-12-31 PROCEDURE — 97110 THERAPEUTIC EXERCISES: CPT

## 2024-12-31 PROCEDURE — 97161 PT EVAL LOW COMPLEX 20 MIN: CPT

## 2025-01-02 ENCOUNTER — LAB (OUTPATIENT)
Dept: UROLOGY | Age: 70
End: 2025-01-02

## 2025-01-02 DIAGNOSIS — C61 PROSTATE CANCER (HCC): ICD-10-CM

## 2025-01-02 LAB — PSA SERPL-MCNC: 6.2 NG/ML (ref 0–4)

## 2025-01-07 ENCOUNTER — HOSPITAL ENCOUNTER (OUTPATIENT)
Dept: PHYSICAL THERAPY | Age: 70
Setting detail: RECURRING SERIES
Discharge: HOME | End: 2025-01-10
Payer: MEDICARE

## 2025-01-07 PROCEDURE — 97140 MANUAL THERAPY 1/> REGIONS: CPT

## 2025-01-07 PROCEDURE — 97110 THERAPEUTIC EXERCISES: CPT

## 2025-01-07 ASSESSMENT — PAIN SCALES - GENERAL: PAINLEVEL_OUTOF10: 0

## 2025-01-07 NOTE — PROGRESS NOTES
Activity/Exercise Parameters Parameters Parameters   Straight leg raises 2 x 10  2 x 10     PROM X 6 minutes to R LE X 6 minutes to R LE    Bridging 2 x 10 in hook lying + add    2 x 10 in hook lying    Sit to stand 2 x 10 Verbal HEP    Heel slide  X15, 3s holds w/ strap    QS  [id number]    SAQ  [id number], 2#    LAQ  [id number], 2#    Hamstring stretch  Manual: 3x30s    HR/TR  [id number]    Slant board stretch  3x30s (slot 2)    Hip abduction  [id number], green band    Standing knee flexion stretch HEP Clinic stairs: 10x10s    NuStep  X8 mins, lvl 2, for ROM and endurance     TKE  Yellow ball @ wall: next session      Time spent with patient reviewing proper muscle recruitment and technique with exercises.     MANUAL THERAPY: (8 minutes):  Joint mobilization and Soft tissue mobilization was utilized and necessary because of the patient's restricted joint motion, painful spasm, loss of articular motion, and restricted motion of soft tissue.   - Joint mobs: Patellar mobs all planes to tolerance.  - Soft tissue mobilization: Hamstring, distal quad, medial knee joint line/pes anserine, gastroc.    MODALITIES: (0 minutes):        None today      HEP: As above; handouts given to patient for all exercises.    Treatment/Session Summary:    Treatment Assessment: Patient showed good tolerance to their therapy session today; added both the SAQ and LAQ exercises into their therapy session to help improve strength progression.      Communication/Consultation:  None today  Equipment provided today:  None  Recommendations/Intent for next treatment session: Next visit will focus on improving R knee motion, R LE strength and overall mobility independence s/p R TKA.    >Total Treatment Billable Duration:  53 minutes  Time In: 810  Time Out: 905    JODI HODGES PTA         Charge Capture  Events  [employer] Portal  Appt Desk  Attendance Report     Future Appointments   Date Time Provider Department Center   2025

## 2025-01-09 ENCOUNTER — OFFICE VISIT (OUTPATIENT)
Dept: UROLOGY | Age: 70
End: 2025-01-09

## 2025-01-09 DIAGNOSIS — R31.29 MICROHEMATURIA: ICD-10-CM

## 2025-01-09 DIAGNOSIS — C61 PROSTATE CANCER (HCC): Primary | ICD-10-CM

## 2025-01-09 LAB
BILIRUBIN, URINE, POC: NEGATIVE
BLOOD URINE, POC: NORMAL
GLUCOSE URINE, POC: NEGATIVE MG/DL
KETONES, URINE, POC: NEGATIVE MG/DL
LEUKOCYTE ESTERASE, URINE, POC: NEGATIVE
NITRITE, URINE, POC: NEGATIVE
PH, URINE, POC: 5.5 (ref 4.6–8)
PROTEIN,URINE, POC: 30 MG/DL
SPECIFIC GRAVITY, URINE, POC: 1.03 (ref 1–1.03)
URINALYSIS CLARITY, POC: NORMAL
URINALYSIS COLOR, POC: NORMAL
UROBILINOGEN, POC: NORMAL MG/DL

## 2025-01-09 NOTE — PROGRESS NOTES
[facility]  [address] [place] [address]  [phone]    Vijay Godinez  : 1955     HPI   69 y.o., male returns in follow up for CaP.  PSA was up to 6.3 on 24. MRI on 24 shows two pirads 4 R lesions and a pirads 5 LM lesion.  Fusion biopsies on 6/10/24 showed Cj 6 in three cores (RB, RLB, RLM).  Vol was 158g.  Prior R orchiopexy for a R UDT.  No new complaints.  PSA is now 6.2 on 25.      Past Medical History:   Diagnosis Date    Chronic pain of right knee     Elevated prostate specific antigen (PSA)     Essential hypertension     Family history of factor V deficiency     patient's son dx 2024    Gastroesophageal reflux disease     History of acute pancreatitis     History of anemia     History of blood transfusion     Iron deficiency anemia     Multiple thyroid nodules     Osteoporosis     Peptic ulcer disease     Primary hyperparathyroidism (HCC)     Followed by endocrinology    Prostate cancer (HCC)     Vitamin D deficiency      Past Surgical History:   Procedure Laterality Date    COLONOSCOPY      Dr. Argueta    COLONOSCOPY  2021    Dr. Argueta; 1 polyp; diverticulosis    ORCHIOPEXY Right     3rd grade    PARATHYROID GLAND SURGERY N/A 2024    PARATHYROIDECTOMY performed by Phillip Lam MD at [facility] MAIN OR    PARATHYROIDECTOMY Right 2024    R inferior adenoma- Carole    PROSTATE BIOPSY N/A 6/10/2024    PROSTATE BIOPSY FUSION performed by Johnny Mccabe DO at [facility] MAIN OR    TOTAL KNEE ARTHROPLASTY Right 12/3/2024    KNEE TOTAL ARTHROPLASTY ROBOTIC VELYS-RIGHT SDD performed by Rob Galeana MD at [facility] MAIN OR     Current Outpatient Medications   Medication Sig Dispense Refill    aspirin 81 MG EC tablet Take 1 tablet by mouth 2 times daily 60 tablet 0    amLODIPine-benazepril (LOTREL) 5-10 MG per capsule Take 1 capsule by mouth daily 90 capsule 2    tadalafil (CIALIS) 5 MG tablet Take 1 tablet by mouth as needed

## 2025-01-09 NOTE — PROGRESS NOTES
Vijay Godinez  : 1955  Primary: Medicare Part A [place] B (Medicare)  [place]  [facility] [place]  [address]  [facility] [address]  Phone: [phone]  Fax: [phone] Plan Frequency: 1-2 visits per week for 6 weeks    Plan of Care/Certification Expiration Date: 02/10/25        Plan of Care/Certification Expiration Date:  Plan of Care/Certification Expiration Date: 02/10/25    Frequency/Duration:   Plan Frequency: 1-2 visits per week for 6 weeks      Time In/Out:   Time In: 0747  Time Out: 0844      PT Visit Info:    Progress Note Counter: 3      Visit Count:  3    OUTPATIENT PHYSICAL THERAPY:   Treatment Note 1/10/2025       Episode  (R TKA)               Treatment Diagnosis:    Difficulty in walking, not elsewhere classified  Stiffness of right knee, not elsewhere classified  Acute pain of right knee  Status post right knee replacement  Medical/Referring Diagnosis:    Status post right knee replacement [Z96.651]    Referring Physician:  Rob Galeana MD MD Orders:  PT Eval and Treat   Return MD Appt:  25   Date of Onset:  Onset Date: 24     Allergies:   Patient has no known allergies.  Restrictions/Precautions:   None      Interventions Planned (Treatment may consist of any combination of the following):     See Assessment Note    Subjective Comments: Patient reported moderate soreness after his last therapy session. Patient stated the soreness subsided after a day or so.     Initial Pain Level::     0/10  Post Session Pain Level:       0/10  Medications Last Reviewed: 1/10/2025  Updated Objective Findings:   Right knee PROM:  lacking 1 - 124 degrees   Treatment     THERAPEUTIC EXERCISE: (45 minutes):    Exercises per grid below to improve mobility and strength.  Required minimal verbal and manual cues to promote proper body alignment and promote proper body mechanics.  Progressed range and repetitions as

## 2025-01-10 ENCOUNTER — HOSPITAL ENCOUNTER (OUTPATIENT)
Dept: PHYSICAL THERAPY | Age: 70
Setting detail: RECURRING SERIES
Discharge: HOME | End: 2025-01-13
Payer: MEDICARE

## 2025-01-10 PROCEDURE — 97110 THERAPEUTIC EXERCISES: CPT

## 2025-01-10 PROCEDURE — 97140 MANUAL THERAPY 1/> REGIONS: CPT

## 2025-01-10 ASSESSMENT — PAIN SCALES - GENERAL: PAINLEVEL_OUTOF10: 0

## 2025-01-13 ENCOUNTER — OFFICE VISIT (OUTPATIENT)
Dept: ORTHOPEDIC SURGERY | Age: 70
End: 2025-01-13

## 2025-01-13 DIAGNOSIS — Z96.651 PRESENCE OF TOTAL KNEE JOINT PROSTHESIS, RIGHT: ICD-10-CM

## 2025-01-13 DIAGNOSIS — Z09 SURGERY FOLLOW-UP: ICD-10-CM

## 2025-01-13 DIAGNOSIS — Z96.651 S/P TOTAL KNEE ARTHROPLASTY, RIGHT: Primary | ICD-10-CM

## 2025-01-13 NOTE — PROGRESS NOTES
Name: Vjiay Godinez  YOB: 1955  Gender: male  MRN: [id number]      Right Post-Op TKA 4 week follow up    This patient returns now four week status post s/p TKA. Things have gone reasonably well with therapy and the patient is now weaning from the cane. The pain level has improved. There has been no significant problem since the patient was last seen. On exam, the incision is healing approriately. ROM is 0 to 125. The patient has minimal antalgia in stance and good alignment in stance.    Radiographs are obtained. Standing AP, flexion lateral and sunrise views of the Right knee demonstrates well positioned TKA with good bone implant interfaces. No significant abnormalities are seen.          RADIOGRAPHIC IMPRESSION: Stable RightTKA.           CLINICAL IMPRESSION AND PLAN: Now four weeks s/p TKA .  The patient is doing reasonably well. I have made recommendations about activity. We will ask the patient to continue to wean from the cane. Recommendations for further therapy include Discontinue formal therapy and continue a home exercise program. The patient will follow back up in in 4-5 months.      Work/Activity Restrictions: Return to work ad rex.    RAMESH MAHARAJ MD

## 2025-01-14 ENCOUNTER — HOSPITAL ENCOUNTER (OUTPATIENT)
Dept: PHYSICAL THERAPY | Age: 70
Setting detail: RECURRING SERIES
Discharge: HOME | End: 2025-01-17
Payer: MEDICARE

## 2025-01-14 PROCEDURE — 97110 THERAPEUTIC EXERCISES: CPT

## 2025-01-14 ASSESSMENT — PAIN SCALES - GENERAL: PAINLEVEL_OUTOF10: 0

## 2025-01-14 NOTE — PROGRESS NOTES
minutes  Time In: 0802  Time Out: 0834    DENISE TABOR, PT         Charge Capture  Events  [employer] Portal  Appt Desk  Attendance Report     Future Appointments   Date Time Provider Department Center   1/20/2025  9:30 AM Nomi Collins MD A [facility] ECC DEP   1/23/2025  1:30 PM Johnny Mccabe, DO [id number] [facility] AMB   1/28/2025  3:00 PM Isak Vale MD END [facility] AMB   4/22/2025 11:00 AM Jm Herzog, APRN - CNP PSCD [facility] AMB   6/9/2025  1:25 PM Rob Galeana MD POAG [facility] AMB   7/10/2025  1:45 PM [id number] BLOOD DRAW [id number] [facility] AMB   7/17/2025  1:30 PM Johnny Mccabe, DO [id number] [facility] AMB

## 2025-01-14 NOTE — THERAPY DISCHARGE
be able to achieve 120 degrees of R knee flexion AROM. MET  Patient will be able to return to work without irritation from R knee. MET  Patient will score <2 on the KOOS. MET  Patient will be independent with HEP. MET            DENIES TABOR, PT       1/14/2025         Charge Capture  Events  Appt Desk  Attendance Report

## 2025-01-20 ENCOUNTER — OFFICE VISIT (OUTPATIENT)
Dept: FAMILY MEDICINE CLINIC | Facility: CLINIC | Age: 70
End: 2025-01-20
Payer: MEDICARE

## 2025-01-20 VITALS
TEMPERATURE: 98.8 F | WEIGHT: 263 LBS | DIASTOLIC BLOOD PRESSURE: 90 MMHG | OXYGEN SATURATION: 97 % | BODY MASS INDEX: 33.75 KG/M2 | HEART RATE: 85 BPM | RESPIRATION RATE: 18 BRPM | HEIGHT: 74 IN | SYSTOLIC BLOOD PRESSURE: 146 MMHG

## 2025-01-20 DIAGNOSIS — N52.9 ERECTILE DYSFUNCTION, UNSPECIFIED ERECTILE DYSFUNCTION TYPE: ICD-10-CM

## 2025-01-20 DIAGNOSIS — R73.03 PREDIABETES: ICD-10-CM

## 2025-01-20 DIAGNOSIS — E55.9 VITAMIN D DEFICIENCY: ICD-10-CM

## 2025-01-20 DIAGNOSIS — Z86.0100 HISTORY OF COLON POLYPS: ICD-10-CM

## 2025-01-20 DIAGNOSIS — Z00.00 MEDICARE ANNUAL WELLNESS VISIT, SUBSEQUENT: Primary | ICD-10-CM

## 2025-01-20 DIAGNOSIS — K14.0 TRAUMATIC ULCERATION OF TONGUE: ICD-10-CM

## 2025-01-20 DIAGNOSIS — I10 ESSENTIAL (PRIMARY) HYPERTENSION: ICD-10-CM

## 2025-01-20 DIAGNOSIS — R35.1 BENIGN PROSTATIC HYPERPLASIA WITH NOCTURIA: ICD-10-CM

## 2025-01-20 DIAGNOSIS — E66.09 CLASS 1 OBESITY DUE TO EXCESS CALORIES WITH SERIOUS COMORBIDITY AND BODY MASS INDEX (BMI) OF 33.0 TO 33.9 IN ADULT: ICD-10-CM

## 2025-01-20 DIAGNOSIS — E66.811 CLASS 1 OBESITY DUE TO EXCESS CALORIES WITH SERIOUS COMORBIDITY AND BODY MASS INDEX (BMI) OF 33.0 TO 33.9 IN ADULT: ICD-10-CM

## 2025-01-20 DIAGNOSIS — N40.1 BENIGN PROSTATIC HYPERPLASIA WITH NOCTURIA: ICD-10-CM

## 2025-01-20 DIAGNOSIS — M81.0 OSTEOPOROSIS WITHOUT CURRENT PATHOLOGICAL FRACTURE, UNSPECIFIED OSTEOPOROSIS TYPE: ICD-10-CM

## 2025-01-20 DIAGNOSIS — C61 PROSTATE CANCER (HCC): ICD-10-CM

## 2025-01-20 PROBLEM — M17.11 OSTEOARTHRITIS OF RIGHT KNEE: Status: RESOLVED | Noted: 2024-09-10 | Resolved: 2025-01-20

## 2025-01-20 PROBLEM — M17.11 ARTHRITIS OF RIGHT KNEE: Status: RESOLVED | Noted: 2024-12-03 | Resolved: 2025-01-20

## 2025-01-20 PROCEDURE — 1036F TOBACCO NON-USER: CPT | Performed by: FAMILY MEDICINE

## 2025-01-20 PROCEDURE — G0439 PPPS, SUBSEQ VISIT: HCPCS | Performed by: FAMILY MEDICINE

## 2025-01-20 PROCEDURE — 1123F ACP DISCUSS/DSCN MKR DOCD: CPT | Performed by: FAMILY MEDICINE

## 2025-01-20 PROCEDURE — G8427 DOCREV CUR MEDS BY ELIG CLIN: HCPCS | Performed by: FAMILY MEDICINE

## 2025-01-20 PROCEDURE — 3017F COLORECTAL CA SCREEN DOC REV: CPT | Performed by: FAMILY MEDICINE

## 2025-01-20 PROCEDURE — 1160F RVW MEDS BY RX/DR IN RCRD: CPT | Performed by: FAMILY MEDICINE

## 2025-01-20 PROCEDURE — 3077F SYST BP >= 140 MM HG: CPT | Performed by: FAMILY MEDICINE

## 2025-01-20 PROCEDURE — 1126F AMNT PAIN NOTED NONE PRSNT: CPT | Performed by: FAMILY MEDICINE

## 2025-01-20 PROCEDURE — 3080F DIAST BP >= 90 MM HG: CPT | Performed by: FAMILY MEDICINE

## 2025-01-20 PROCEDURE — 99214 OFFICE O/P EST MOD 30 MIN: CPT | Performed by: FAMILY MEDICINE

## 2025-01-20 PROCEDURE — G2211 COMPLEX E/M VISIT ADD ON: HCPCS | Performed by: FAMILY MEDICINE

## 2025-01-20 PROCEDURE — G8417 CALC BMI ABV UP PARAM F/U: HCPCS | Performed by: FAMILY MEDICINE

## 2025-01-20 PROCEDURE — 1159F MED LIST DOCD IN RCRD: CPT | Performed by: FAMILY MEDICINE

## 2025-01-20 RX ORDER — AMLODIPINE AND BENAZEPRIL HYDROCHLORIDE 5; 10 MG/1; MG/1
1 CAPSULE ORAL DAILY
Qty: 90 CAPSULE | Refills: 3 | Status: SHIPPED | OUTPATIENT
Start: 2025-01-20

## 2025-01-20 RX ORDER — TADALAFIL 5 MG/1
5 TABLET ORAL DAILY
Qty: 90 TABLET | Refills: 3 | Status: SHIPPED | OUTPATIENT
Start: 2025-01-20

## 2025-01-20 SDOH — ECONOMIC STABILITY: FOOD INSECURITY: WITHIN THE PAST 12 MONTHS, THE FOOD YOU BOUGHT JUST DIDN'T LAST AND YOU DIDN'T HAVE MONEY TO GET MORE.: PATIENT DECLINED

## 2025-01-20 SDOH — ECONOMIC STABILITY: FOOD INSECURITY: WITHIN THE PAST 12 MONTHS, YOU WORRIED THAT YOUR FOOD WOULD RUN OUT BEFORE YOU GOT MONEY TO BUY MORE.: PATIENT DECLINED

## 2025-01-20 ASSESSMENT — PATIENT HEALTH QUESTIONNAIRE - PHQ9
2. FEELING DOWN, DEPRESSED OR HOPELESS: NOT AT ALL
SUM OF ALL RESPONSES TO PHQ9 QUESTIONS 1 & 2: 0
SUM OF ALL RESPONSES TO PHQ QUESTIONS 1-9: 0
1. LITTLE INTEREST OR PLEASURE IN DOING THINGS: NOT AT ALL

## 2025-01-20 ASSESSMENT — LIFESTYLE VARIABLES
HOW OFTEN DO YOU HAVE A DRINK CONTAINING ALCOHOL: MONTHLY OR LESS
HOW MANY STANDARD DRINKS CONTAINING ALCOHOL DO YOU HAVE ON A TYPICAL DAY: 1 OR 2

## 2025-01-20 NOTE — PROGRESS NOTES
Medicare Annual Wellness Visit    Vijay Godinez is here for Follow-up Chronic Condition (6 month f/u non fasting. ), Medicare AWV (sub), and Referral - General (F/u from urology )    Assessment & Plan   Medicare annual wellness visit, subsequent  -     [facility] - Referral to ACP Clinical Specialist  Osteoporosis without current pathological fracture, unspecified osteoporosis type  History of colon polyps    Discussed with patient in detail Medicare subsequent annual wellness exam.  Wellness/anticipatory guidance information provided.  Information on advanced directives discussed and printed.  Immunizations up-to-date.  Patient was referred for ACP assistance.  JONAH signed for [facility] with last exam in the last few months.  Recommended yearly dental exam.  He will need a repeat DEXA scan next year.  Last colonoscopy was 2021.       Return in about 6 months (around 7/20/2025) for fasting.           Patient's complete Health Risk Assessment and screening values have been reviewed and are found in Flowsheets. The following problems were reviewed today and where indicated follow up appointments were made and/or referrals ordered.    Positive Risk Factor Screenings with Interventions:              Inactivity:  On average, how many days per week do you engage in moderate to strenuous exercise (like a brisk walk)?: 2 days (!) Abnormal  On average, how many minutes do you engage in exercise at this level?: 20 min  Interventions:  See AVS for additional education material    Poor Eating Habits/Diet:  Do you eat balanced/healthy meals regularly?: (!) No  Interventions:  See AVS for additional education material    Abnormal BMI (obese):  Body mass index is 33.77 kg/m². (!) Abnormal  Interventions:  See AVS for additional education material        Dentist Screen:  Have you seen the dentist within the past year?: (!) No    Intervention:  Advised to schedule with their dentist      Safety:  Do you have non-slip mats or

## 2025-01-20 NOTE — PATIENT INSTRUCTIONS
Learning About Being Active as an Older Adult  Why is being active important as you get older?     Being active is one of the best things you can do for your health. And it's never too late to start. Being active--or getting active, if you aren't already--has definite benefits. It can:  Give you more energy,  Keep your mind sharp.  Improve balance to reduce your risk of falls.  Help you manage chronic illness with fewer medicines.  No matter how old you are, how fit you are, or what health problems you have, there is a form of activity that will work for you. And the more physical activity you can do, the better your overall health will be.  What kinds of activity can help you stay healthy?  Being more active will make your daily activities easier. Physical activity includes planned exercise and things you do in daily life. There are four types of activity:  Aerobic.  Doing aerobic activity makes your heart and lungs strong.  Includes walking, dancing, and gardening.  Aim for at least 2½ hours spread throughout the week.  It improves your energy and can help you sleep better.  Muscle-strengthening.  This type of activity can help maintain muscle and strengthen bones.  Includes climbing stairs, using resistance bands, and lifting or carrying heavy loads.  Aim for at least twice a week.  It can help protect the knees and other joints.  Stretching.  Stretching gives you better range of motion in joints and muscles.  Includes upper arm stretches, calf stretches, and gentle yoga.  Aim for at least twice a week, preferably after your muscles are warmed up from other activities.  It can help you function better in daily life.  Balancing.  This helps you stay coordinated and have good posture.  Includes heel-to-toe walking, evonne chi, and certain types of yoga.  Aim for at least 3 days a week.  It can reduce your risk of falling.  Even if you have a hard time meeting the recommendations, it's better to be more active

## 2025-01-21 ENCOUNTER — CLINICAL DOCUMENTATION (OUTPATIENT)
Dept: SPIRITUAL SERVICES | Age: 70
End: 2025-01-21

## 2025-01-21 ENCOUNTER — APPOINTMENT (OUTPATIENT)
Dept: PHYSICAL THERAPY | Age: 70
End: 2025-01-21
Payer: MEDICARE

## 2025-01-21 NOTE — PROGRESS NOTES
Advance Care Planning   Ambulatory ACP Specialist Patient Outreach    Date:  1/21/2025    ACP Specialist:  Carmen Gaytan    Outreach call to patient in follow-up to ACP Specialist referral from:Nomi Collins MD    [x] PCP  [] Provider   [] Ambulatory Care Management [] Other     For:                  [x] Advance Directive Assistance              [] Complete Portable DNR order              [] Complete POST/POLST/MOST              [] Code Status Discussion             [] Discuss Goals of Care             [] Early ACP Decision-Making              [] Other (Specify)    Date Referral Received:1/20/25    Next Step:   [] ACP scheduled conversation  [] Outreach again in one week               [] Email / Mail ACP Info Sheets  [] Email / Mail Advance Directive   [x] Closing referral.  Routing closure to referring provider/staff and to ACP Specialist .    [x] Closure letter mailed to patient with invitation to contact ACP Specialist if / when ready.   [] Other (Specify here):       [x] At this time, Healthcare Decision Maker Is:   Primary Decision Maker: Sonia Godinez - [facility] - [phone]         [] Primary agent named in scanned advance directive.    [x] Legal Next of Kin.     [] Unable to determine legal decision maker at this time.    Outreaches:         [x] 1st -  Date:  1/21/25               Intervention:  [] Spoke with Patient   [x] Left Voice mail [] Email / Mail    [] Isomarkhart  [] Other (Specify) :     Outcomes:  Outreach phone call to the patient on mobile phone number. Unable to reach patient.  Provided contact information on voicemail requesting a return call.  Will attempt to follow up in one week.    Addendum:  Date:  1/21/25  Received call back from patient declining to move forward with scheduling an ACP conversation. Patient agreed to have ACP information sent to his e-mail address for future outreach. Invited patient to reach out at any time in the future if he would like assistance.

## 2025-01-23 ENCOUNTER — NURSE ONLY (OUTPATIENT)
Dept: UROLOGY | Age: 70
End: 2025-01-23
Payer: MEDICARE

## 2025-01-23 DIAGNOSIS — R31.29 MICROHEMATURIA: Primary | ICD-10-CM

## 2025-01-23 PROCEDURE — 81003 URINALYSIS AUTO W/O SCOPE: CPT | Performed by: UROLOGY

## 2025-01-23 NOTE — PROGRESS NOTES
UA - Dipstick  Results for orders placed or performed in visit on 01/23/25   AMB POC URINALYSIS DIP STICK AUTO W/O MICRO    Collection Time: 01/23/25  1:26 PM   Result Value Ref Range    Color (UA POC)      Clarity (UA POC)      Glucose, Urine, POC Negative Negative mg/dL    Bilirubin, Urine, POC Negative Negative    KETONES, Urine, POC Negative Negative mg/dL    Specific Gravity, Urine, POC 1.030 1.001 - 1.035    Blood (UA POC) Moderate     pH, Urine, POC 5.5 4.6 - 8.0    Protein, Urine, POC 30 Negative mg/dL    Urobilinogen, POC 0.2 mg/dL <1.1 mg/dL    Nitrite, Urine, POC Negative Negative    Leukocyte Esterase, Urine, POC Negative Negative       UA - Micro  WBC - 0  RBC - 10-15  Bacteria - 0  Epith - 0      Requested Prescriptions      No prescriptions requested or ordered in this encounter     Plan    Diagnosis Orders   1. Microhematuria  AMB POC URINALYSIS DIP STICK AUTO W/O MICRO          Microhematuria persists.  I recommended work up.  Please schedule cysto in 2-3 wks and I will order CT.

## 2025-01-28 ENCOUNTER — APPOINTMENT (OUTPATIENT)
Dept: PHYSICAL THERAPY | Age: 70
End: 2025-01-28
Payer: MEDICARE

## 2025-02-10 ENCOUNTER — PROCEDURE VISIT (OUTPATIENT)
Dept: UROLOGY | Age: 70
End: 2025-02-10
Payer: MEDICARE

## 2025-02-10 DIAGNOSIS — R82.90 ABNORMAL URINALYSIS: ICD-10-CM

## 2025-02-10 DIAGNOSIS — C61 PROSTATE CANCER (HCC): ICD-10-CM

## 2025-02-10 DIAGNOSIS — N39.0 URINARY TRACT INFECTION WITHOUT HEMATURIA, SITE UNSPECIFIED: ICD-10-CM

## 2025-02-10 DIAGNOSIS — R31.29 MICROHEMATURIA: Primary | ICD-10-CM

## 2025-02-10 LAB
BILIRUBIN, URINE, POC: NEGATIVE
BLOOD URINE, POC: ABNORMAL
GLUCOSE URINE, POC: NEGATIVE MG/DL
KETONES, URINE, POC: NEGATIVE MG/DL
LEUKOCYTE ESTERASE, URINE, POC: ABNORMAL
NITRITE, URINE, POC: POSITIVE
PH, URINE, POC: 5.5 (ref 4.6–8)
PROTEIN,URINE, POC: 100 MG/DL
SPECIFIC GRAVITY, URINE, POC: 1.03 (ref 1–1.03)
URINALYSIS CLARITY, POC: ABNORMAL
URINALYSIS COLOR, POC: ABNORMAL
UROBILINOGEN, POC: ABNORMAL MG/DL

## 2025-02-10 PROCEDURE — 99213 OFFICE O/P EST LOW 20 MIN: CPT | Performed by: UROLOGY

## 2025-02-10 PROCEDURE — 3017F COLORECTAL CA SCREEN DOC REV: CPT | Performed by: UROLOGY

## 2025-02-10 PROCEDURE — G8417 CALC BMI ABV UP PARAM F/U: HCPCS | Performed by: UROLOGY

## 2025-02-10 PROCEDURE — 1123F ACP DISCUSS/DSCN MKR DOCD: CPT | Performed by: UROLOGY

## 2025-02-10 PROCEDURE — 81003 URINALYSIS AUTO W/O SCOPE: CPT | Performed by: UROLOGY

## 2025-02-10 PROCEDURE — G8428 CUR MEDS NOT DOCUMENT: HCPCS | Performed by: UROLOGY

## 2025-02-10 PROCEDURE — 1036F TOBACCO NON-USER: CPT | Performed by: UROLOGY

## 2025-02-10 RX ORDER — SULFAMETHOXAZOLE AND TRIMETHOPRIM 800; 160 MG/1; MG/1
1 TABLET ORAL 2 TIMES DAILY
Qty: 14 TABLET | Refills: 0 | Status: SHIPPED | OUTPATIENT
Start: 2025-02-10 | End: 2025-02-17

## 2025-02-10 NOTE — PROGRESS NOTES
[facility]  [address]   [address]  [place] [address]  [phone]    Vijay Godinez  : 1955         HPI   69 y.o., male presents for cystoscopy       Past Medical History:   Diagnosis Date    Chronic pain of right knee     Elevated prostate specific antigen (PSA)     Essential hypertension     Family history of factor V deficiency     patient's son dx 2024    Gastroesophageal reflux disease     History of acute pancreatitis 2007    History of anemia     History of blood transfusion     Iron deficiency anemia     Multiple thyroid nodules     Osteoporosis     Peptic ulcer disease     Primary hyperparathyroidism (HCC)     Followed by endocrinology    Prostate cancer (HCC)     Vitamin D deficiency      Past Surgical History:   Procedure Laterality Date    COLONOSCOPY      Dr. Argueta    COLONOSCOPY  2021    Dr. Argueta; 1 polyp; diverticulosis    ORCHIOPEXY Right     3rd grade    PARATHYROID GLAND SURGERY N/A 2024    PARATHYROIDECTOMY performed by Phillip Lam MD at [facility] MAIN OR    PARATHYROIDECTOMY Right 2024    R inferior adenoma- [facility]    PROSTATE BIOPSY N/A 6/10/2024    PROSTATE BIOPSY FUSION performed by Johnny Mccabe DO at [facility] MAIN OR    TOTAL KNEE ARTHROPLASTY Right 12/3/2024    KNEE TOTAL ARTHROPLASTY ROBOTIC VELYS-RIGHT SDD performed by Rob Galeana MD at [facility] MAIN OR     Current Outpatient Medications   Medication Sig Dispense Refill    amLODIPine-benazepril (LOTREL) 5-10 MG per capsule Take 1 capsule by mouth daily 90 capsule 3    tadalafil (CIALIS) 5 MG tablet Take 1 tablet by mouth daily 90 tablet 3    Magic Mouthwash (MIRACLE MOUTHWASH) Swish and spit 5 mLs 4 times daily as needed for Irritation (Throat pain) Equal parts Maalox, Benadryl, nystatin, and viscous lidocaine. 240 mL 0    acetaminophen (TYLENOL) 500 MG tablet Take 1 tablet by mouth every 6 hours as needed for Pain       No current facility-administered 
DS) 800-160 MG per tablet Take 1 tablet by mouth 2 times daily for 7 days 14 tablet 0    amLODIPine-benazepril (LOTREL) 5-10 MG per capsule Take 1 capsule by mouth daily 90 capsule 3    tadalafil (CIALIS) 5 MG tablet Take 1 tablet by mouth daily 90 tablet 3    Magic Mouthwash (MIRACLE MOUTHWASH) Swish and spit 5 mLs 4 times daily as needed for Irritation (Throat pain) Equal parts Maalox, Benadryl, nystatin, and viscous lidocaine. 240 mL 0    acetaminophen (TYLENOL) 500 MG tablet Take 1 tablet by mouth every 6 hours as needed for Pain       No current facility-administered medications for this visit.     No Known Allergies  Social History     Socioeconomic History    Marital status:      Spouse name: Not on file    Number of children: Not on file    Years of education: Not on file    Highest education level: Not on file   Occupational History    Not on file   Tobacco Use    Smoking status: Never    Smokeless tobacco: Former     Types: Chew   Vaping Use    Vaping status: Never Used   Substance and Sexual Activity    Alcohol use: Not Currently     Alcohol/week: 5.0 standard drinks of alcohol     Types: 5 Shots of liquor per week    Drug use: No    Sexual activity: Not on file   Other Topics Concern    Not on file   Social History Narrative    Not on file     Social Determinants of Health     Financial Resource Strain: Low Risk  (2/26/2024)    Overall Financial Resource Strain (CARDIA)     Difficulty of Paying Living Expenses: Not hard at all   Food Insecurity: Patient Declined (1/20/2025)    Hunger Vital Sign     Worried About Running Out of Food in the Last Year: Patient declined     Ran Out of Food in the Last Year: Patient declined   Transportation Needs: Patient Declined (1/20/2025)    PRAPARE - Transportation     Lack of Transportation (Medical): Patient declined     Lack of Transportation (Non-Medical): Patient declined   Physical Activity: Insufficiently Active (1/20/2025)    Exercise Vital Sign     Days

## 2025-02-12 ENCOUNTER — TELEPHONE (OUTPATIENT)
Dept: UROLOGY | Age: 70
End: 2025-02-12

## 2025-02-12 LAB
BACTERIA SPEC CULT: NORMAL
SERVICE CMNT-IMP: NORMAL

## 2025-02-12 NOTE — TELEPHONE ENCOUNTER
Called patient and left a voicemail about finishing the bactrim. And seeing if he was feeling better.

## 2025-02-12 NOTE — TELEPHONE ENCOUNTER
----- Message from Dr. Johnny Mccabe, DO sent at 2/12/2025  7:39 AM EST -----  Please have pt finish Bactrim.  Can you please call him today to make sure he is feeling better?  Thanks.

## 2025-02-18 ENCOUNTER — PROCEDURE VISIT (OUTPATIENT)
Dept: UROLOGY | Age: 70
End: 2025-02-18
Payer: MEDICARE

## 2025-02-18 ENCOUNTER — TELEPHONE (OUTPATIENT)
Dept: UROLOGY | Age: 70
End: 2025-02-18

## 2025-02-18 DIAGNOSIS — N20.0 RENAL STONE: ICD-10-CM

## 2025-02-18 DIAGNOSIS — R31.29 MICROHEMATURIA: Primary | ICD-10-CM

## 2025-02-18 DIAGNOSIS — C61 PROSTATE CANCER (HCC): ICD-10-CM

## 2025-02-18 LAB
BILIRUBIN, URINE, POC: NEGATIVE
BLOOD URINE, POC: NORMAL
GLUCOSE URINE, POC: NEGATIVE MG/DL
KETONES, URINE, POC: NEGATIVE MG/DL
LEUKOCYTE ESTERASE, URINE, POC: NORMAL
NITRITE, URINE, POC: NEGATIVE
PH, URINE, POC: 6 (ref 4.6–8)
PROTEIN,URINE, POC: NEGATIVE MG/DL
SPECIFIC GRAVITY, URINE, POC: 1.02 (ref 1–1.03)
URINALYSIS CLARITY, POC: NORMAL
URINALYSIS COLOR, POC: NORMAL
UROBILINOGEN, POC: NORMAL MG/DL

## 2025-02-18 PROCEDURE — 81003 URINALYSIS AUTO W/O SCOPE: CPT | Performed by: UROLOGY

## 2025-02-18 PROCEDURE — 52000 CYSTOURETHROSCOPY: CPT | Performed by: UROLOGY

## 2025-02-18 PROCEDURE — 1123F ACP DISCUSS/DSCN MKR DOCD: CPT | Performed by: UROLOGY

## 2025-02-18 PROCEDURE — 1159F MED LIST DOCD IN RCRD: CPT | Performed by: UROLOGY

## 2025-02-18 PROCEDURE — 1036F TOBACCO NON-USER: CPT | Performed by: UROLOGY

## 2025-02-18 PROCEDURE — 3017F COLORECTAL CA SCREEN DOC REV: CPT | Performed by: UROLOGY

## 2025-02-18 PROCEDURE — 99214 OFFICE O/P EST MOD 30 MIN: CPT | Performed by: UROLOGY

## 2025-02-18 PROCEDURE — G8417 CALC BMI ABV UP PARAM F/U: HCPCS | Performed by: UROLOGY

## 2025-02-18 PROCEDURE — 1160F RVW MEDS BY RX/DR IN RCRD: CPT | Performed by: UROLOGY

## 2025-02-18 PROCEDURE — G8427 DOCREV CUR MEDS BY ELIG CLIN: HCPCS | Performed by: UROLOGY

## 2025-02-18 NOTE — TELEPHONE ENCOUNTER
----- Message from Dr. Johnny Mccabe DO sent at 2/18/2025 12:05 PM EST -----  Regarding: surg  Cysto, left ureteroscopy, laser, stent  1h  Gen  Outpt  Cbc, bmp, ua (no pat)  Cipro 400mg IV preop  Non urgent

## 2025-02-18 NOTE — PROGRESS NOTES
Vital Sign     Days of Exercise per Week: 2 days     Minutes of Exercise per Session: 20 min   Stress: Not on file   Social Connections: Unknown (6/26/2024)    Received from [facility]    Social Connections     Frequency of Communication with Friends and Family: Not asked     Frequency of Social Gatherings with Friends and Family: Not asked   Intimate Partner Violence: Unknown (6/26/2024)    Received from [facility]    Intimate Partner Violence     Fear of Current or Ex-Partner: Not asked     Emotionally Abused: Not asked     Physically Abused: Not asked     Sexually Abused: Not asked   Housing Stability: Patient Declined (1/20/2025)    Housing Stability Vital Sign     Unable to Pay for Housing in the Last Year: Patient declined     Number of Times Moved in the Last Year: 0     Homeless in the Last Year: Patient declined     Family History   Problem Relation Age of Onset    COPD Mother     Thyroid Disease Father         nodules    Hypertension Father     Melanoma Father     Atrial Fibrillation Father     Breast Cancer Sister     Stroke Paternal Grandfather        There were no vitals taken for this visit.    UA - Dipstick  Results for orders placed or performed in visit on 02/18/25   AMB POC URINALYSIS DIP STICK AUTO W/O MICRO    Collection Time: 02/18/25 10:33 AM   Result Value Ref Range    Color (UA POC)      Clarity (UA POC)      Glucose, Urine, POC Negative Negative mg/dL    Bilirubin, Urine, POC Negative Negative    KETONES, Urine, POC Negative Negative mg/dL    Specific Gravity, Urine, POC 1.020 1.001 - 1.035    Blood (UA POC) Moderate     pH, Urine, POC 6.0 4.6 - 8.0    Protein, Urine, POC Negative Negative mg/dL    Urobilinogen, POC 0.2 mg/dL <1.1 mg/dL    Nitrite, Urine, POC Negative Negative    Leukocyte Esterase, Urine, POC Trace Negative       UA - Micro  WBC - 2-3  RBC - 10-15  Bacteria - 0  Epith - 0          Cystoscopy Procedure:     Procedure Room #

## 2025-02-23 ENCOUNTER — PREP FOR PROCEDURE (OUTPATIENT)
Dept: UROLOGY | Age: 70
End: 2025-02-23

## 2025-02-24 PROBLEM — N20.0 KIDNEY STONE: Status: ACTIVE | Noted: 2025-02-18

## 2025-03-24 NOTE — PERIOP NOTE
Patient verified name and .  Order for consent  was found in EHR and does match case posting; patient verifies procedure.   Type 1B surgery, PAT phone assessment complete.  Orders received.  Labs per surgeon: UA, CBC, BMP w/ reflex to mg found s/h for pre-op  Labs per anesthesia protocol: no additional labs indicated    Patient answered medical/surgical history questions at their best of ability. All prior to admission medications documented in EPIC.    Patient instructed to continue taking all prescription medications up to the day of surgery but to take only the following medications the day of surgery according to anesthesia guidelines with a small sip of water: none Also, patient is requested to take 2 Tylenol in the morning and then again before bed on the day before surgery. Regular or extra strength may be used.       Patient informed that all vitamins and supplements should be held 7 days prior to surgery and NSAIDS 5 days prior to surgery. Prescription meds to hold: tadalafil beginning immediately. Do not take amlodipine-benazepril on the morning of procedure.    Patient instructed on the following:    > Arrive at Main Entrance, time of arrival to be called the day before by 1700  > No food after midnight, patient may drink clear liquids up until 2 hours prior to arrival. No gum, candy, mints.   > Responsible adult must drive patient to the hospital, stay during surgery, and patient will need supervision 24 hours after anesthesia  > Use non moisturizing, antibacterial soap in shower the night before surgery and on the morning of surgery  > All piercings must be removed prior to arrival.    > Leave all valuables (money and jewelry) at home but bring insurance card and ID on DOS.   > You may be required to pay a deductible or co-pay on the day of your procedure. You can pre-pay by calling [phone] if your surgery is at the [facility] or [phone] if your surgery is at the [facility].  > Do not

## 2025-03-26 ENCOUNTER — ANESTHESIA EVENT (OUTPATIENT)
Dept: SURGERY | Age: 70
End: 2025-03-26
Payer: MEDICARE

## 2025-03-27 ENCOUNTER — APPOINTMENT (OUTPATIENT)
Dept: GENERAL RADIOLOGY | Age: 70
End: 2025-03-27
Attending: UROLOGY
Payer: MEDICARE

## 2025-03-27 ENCOUNTER — HOSPITAL ENCOUNTER (OUTPATIENT)
Age: 70
Setting detail: OUTPATIENT SURGERY
Discharge: HOME | End: 2025-03-27
Attending: UROLOGY | Admitting: UROLOGY
Payer: MEDICARE

## 2025-03-27 ENCOUNTER — ANESTHESIA (OUTPATIENT)
Dept: SURGERY | Age: 70
End: 2025-03-27
Payer: MEDICARE

## 2025-03-27 VITALS
SYSTOLIC BLOOD PRESSURE: 154 MMHG | HEIGHT: 74 IN | WEIGHT: 249 LBS | HEART RATE: 85 BPM | BODY MASS INDEX: 31.95 KG/M2 | RESPIRATION RATE: 18 BRPM | DIASTOLIC BLOOD PRESSURE: 84 MMHG | TEMPERATURE: 97 F | OXYGEN SATURATION: 95 %

## 2025-03-27 LAB
ANION GAP SERPL CALC-SCNC: 10 MMOL/L (ref 7–16)
APPEARANCE UR: ABNORMAL
BACTERIA URNS QL MICRO: ABNORMAL /HPF
BILIRUB UR QL: NEGATIVE
BUN SERPL-MCNC: 12 MG/DL (ref 8–23)
CALCIUM SERPL-MCNC: 9.3 MG/DL (ref 8.8–10.2)
CHLORIDE SERPL-SCNC: 103 MMOL/L (ref 98–107)
CO2 SERPL-SCNC: 25 MMOL/L (ref 20–29)
COLOR UR: ABNORMAL
CREAT SERPL-MCNC: 0.82 MG/DL (ref 0.8–1.3)
EPI CELLS #/AREA URNS HPF: ABNORMAL /HPF
ERYTHROCYTE [DISTWIDTH] IN BLOOD BY AUTOMATED COUNT: 12.8 % (ref 11.9–14.6)
GLUCOSE SERPL-MCNC: 118 MG/DL (ref 70–99)
GLUCOSE UR STRIP.AUTO-MCNC: NEGATIVE MG/DL
HCT VFR BLD AUTO: 37.9 % (ref 41.1–50.3)
HGB BLD-MCNC: 12.6 G/DL (ref 13.6–17.2)
HGB UR QL STRIP: ABNORMAL
KETONES UR QL STRIP.AUTO: NEGATIVE MG/DL
LEUKOCYTE ESTERASE UR QL STRIP.AUTO: ABNORMAL
MCH RBC QN AUTO: 27.8 PG (ref 26.1–32.9)
MCHC RBC AUTO-ENTMCNC: 33.2 G/DL (ref 31.4–35)
MCV RBC AUTO: 83.5 FL (ref 82–102)
MUCOUS THREADS URNS QL MICRO: 0 /LPF
NITRITE UR QL STRIP.AUTO: POSITIVE
NRBC # BLD: 0 K/UL (ref 0–0.2)
OTHER OBSERVATIONS: ABNORMAL
PH UR STRIP: 6.5 (ref 5–9)
PLATELET # BLD AUTO: 251 K/UL (ref 150–450)
PMV BLD AUTO: 8.2 FL (ref 9.4–12.3)
POTASSIUM SERPL-SCNC: 4.3 MMOL/L (ref 3.5–5.1)
PROT UR STRIP-MCNC: 30 MG/DL
RBC # BLD AUTO: 4.54 M/UL (ref 4.23–5.6)
RBC #/AREA URNS HPF: ABNORMAL /HPF
SODIUM SERPL-SCNC: 138 MMOL/L (ref 136–145)
SP GR UR REFRACTOMETRY: 1.01 (ref 1–1.02)
UROBILINOGEN UR QL STRIP.AUTO: 0.2 EU/DL (ref 0.2–1)
WBC # BLD AUTO: 6.3 K/UL (ref 4.3–11.1)
WBC URNS QL MICRO: >100 /HPF

## 2025-03-27 PROCEDURE — 7100000010 HC PHASE II RECOVERY - FIRST 15 MIN: Performed by: UROLOGY

## 2025-03-27 PROCEDURE — 2580000003 HC RX 258: Performed by: ANESTHESIOLOGY

## 2025-03-27 PROCEDURE — 6360000002 HC RX W HCPCS: Performed by: NURSE ANESTHETIST, CERTIFIED REGISTERED

## 2025-03-27 PROCEDURE — C1747 HC ENDOSCOPE, SINGLE, URINARY TRACT: HCPCS | Performed by: UROLOGY

## 2025-03-27 PROCEDURE — C1894 INTRO/SHEATH, NON-LASER: HCPCS | Performed by: UROLOGY

## 2025-03-27 PROCEDURE — 7100000011 HC PHASE II RECOVERY - ADDTL 15 MIN: Performed by: UROLOGY

## 2025-03-27 PROCEDURE — 81001 URINALYSIS AUTO W/SCOPE: CPT

## 2025-03-27 PROCEDURE — 3700000000 HC ANESTHESIA ATTENDED CARE: Performed by: UROLOGY

## 2025-03-27 PROCEDURE — 3600000004 HC SURGERY LEVEL 4 BASE: Performed by: UROLOGY

## 2025-03-27 PROCEDURE — C1758 CATHETER, URETERAL: HCPCS | Performed by: UROLOGY

## 2025-03-27 PROCEDURE — 7100000001 HC PACU RECOVERY - ADDTL 15 MIN: Performed by: UROLOGY

## 2025-03-27 PROCEDURE — 7100000000 HC PACU RECOVERY - FIRST 15 MIN: Performed by: UROLOGY

## 2025-03-27 PROCEDURE — 81015 MICROSCOPIC EXAM OF URINE: CPT

## 2025-03-27 PROCEDURE — 3700000001 HC ADD 15 MINUTES (ANESTHESIA): Performed by: UROLOGY

## 2025-03-27 PROCEDURE — 3600000014 HC SURGERY LEVEL 4 ADDTL 15MIN: Performed by: UROLOGY

## 2025-03-27 PROCEDURE — 2500000003 HC RX 250 WO HCPCS: Performed by: NURSE ANESTHETIST, CERTIFIED REGISTERED

## 2025-03-27 PROCEDURE — 6370000000 HC RX 637 (ALT 250 FOR IP): Performed by: ANESTHESIOLOGY

## 2025-03-27 PROCEDURE — C2617 STENT, NON-COR, TEM W/O DEL: HCPCS | Performed by: UROLOGY

## 2025-03-27 PROCEDURE — 85027 COMPLETE CBC AUTOMATED: CPT

## 2025-03-27 PROCEDURE — 80048 BASIC METABOLIC PNL TOTAL CA: CPT

## 2025-03-27 PROCEDURE — 2720000010 HC SURG SUPPLY STERILE: Performed by: UROLOGY

## 2025-03-27 PROCEDURE — C1769 GUIDE WIRE: HCPCS | Performed by: UROLOGY

## 2025-03-27 PROCEDURE — 2709999900 HC NON-CHARGEABLE SUPPLY: Performed by: UROLOGY

## 2025-03-27 PROCEDURE — 6360000002 HC RX W HCPCS: Performed by: UROLOGY

## 2025-03-27 RX ORDER — SODIUM CHLORIDE, SODIUM LACTATE, POTASSIUM CHLORIDE, CALCIUM CHLORIDE 600; 310; 30; 20 MG/100ML; MG/100ML; MG/100ML; MG/100ML
INJECTION, SOLUTION INTRAVENOUS CONTINUOUS
Status: DISCONTINUED | OUTPATIENT
Start: 2025-03-27 | End: 2025-03-27 | Stop reason: HOSPADM

## 2025-03-27 RX ORDER — SODIUM CHLORIDE 0.9 % (FLUSH) 0.9 %
5-40 SYRINGE (ML) INJECTION PRN
Status: DISCONTINUED | OUTPATIENT
Start: 2025-03-27 | End: 2025-03-27 | Stop reason: HOSPADM

## 2025-03-27 RX ORDER — PROPOFOL 10 MG/ML
INJECTION, EMULSION INTRAVENOUS
Status: DISCONTINUED | OUTPATIENT
Start: 2025-03-27 | End: 2025-03-27 | Stop reason: SDUPTHER

## 2025-03-27 RX ORDER — SULFAMETHOXAZOLE AND TRIMETHOPRIM 800; 160 MG/1; MG/1
1 TABLET ORAL 2 TIMES DAILY
Qty: 14 TABLET | Refills: 0 | Status: SHIPPED | OUTPATIENT
Start: 2025-03-27 | End: 2025-04-03

## 2025-03-27 RX ORDER — NEOSTIGMINE METHYLSULFATE 1 MG/ML
INJECTION INTRAVENOUS
Status: DISCONTINUED | OUTPATIENT
Start: 2025-03-27 | End: 2025-03-27 | Stop reason: SDUPTHER

## 2025-03-27 RX ORDER — LEVOFLOXACIN 5 MG/ML
500 INJECTION, SOLUTION INTRAVENOUS
Status: COMPLETED | OUTPATIENT
Start: 2025-03-27 | End: 2025-03-27

## 2025-03-27 RX ORDER — LIDOCAINE HYDROCHLORIDE 10 MG/ML
1 INJECTION, SOLUTION INFILTRATION; PERINEURAL
Status: DISCONTINUED | OUTPATIENT
Start: 2025-03-27 | End: 2025-03-27 | Stop reason: HOSPADM

## 2025-03-27 RX ORDER — ONDANSETRON 2 MG/ML
INJECTION INTRAMUSCULAR; INTRAVENOUS
Status: DISCONTINUED | OUTPATIENT
Start: 2025-03-27 | End: 2025-03-27 | Stop reason: SDUPTHER

## 2025-03-27 RX ORDER — GLYCOPYRROLATE 0.2 MG/ML
INJECTION INTRAMUSCULAR; INTRAVENOUS
Status: DISCONTINUED | OUTPATIENT
Start: 2025-03-27 | End: 2025-03-27 | Stop reason: SDUPTHER

## 2025-03-27 RX ORDER — ONDANSETRON 2 MG/ML
4 INJECTION INTRAMUSCULAR; INTRAVENOUS
Status: DISCONTINUED | OUTPATIENT
Start: 2025-03-27 | End: 2025-03-27 | Stop reason: HOSPADM

## 2025-03-27 RX ORDER — SODIUM CHLORIDE 9 MG/ML
INJECTION, SOLUTION INTRAVENOUS PRN
Status: DISCONTINUED | OUTPATIENT
Start: 2025-03-27 | End: 2025-03-27 | Stop reason: HOSPADM

## 2025-03-27 RX ORDER — PROCHLORPERAZINE EDISYLATE 5 MG/ML
5 INJECTION INTRAMUSCULAR; INTRAVENOUS
Status: DISCONTINUED | OUTPATIENT
Start: 2025-03-27 | End: 2025-03-27 | Stop reason: HOSPADM

## 2025-03-27 RX ORDER — DEXAMETHASONE SODIUM PHOSPHATE 4 MG/ML
INJECTION, SOLUTION INTRA-ARTICULAR; INTRALESIONAL; INTRAMUSCULAR; INTRAVENOUS; SOFT TISSUE
Status: DISCONTINUED | OUTPATIENT
Start: 2025-03-27 | End: 2025-03-27 | Stop reason: SDUPTHER

## 2025-03-27 RX ORDER — OXYCODONE HYDROCHLORIDE 5 MG/1
5 TABLET ORAL PRN
Status: DISCONTINUED | OUTPATIENT
Start: 2025-03-27 | End: 2025-03-27 | Stop reason: HOSPADM

## 2025-03-27 RX ORDER — MIDAZOLAM HYDROCHLORIDE 2 MG/2ML
2 INJECTION, SOLUTION INTRAMUSCULAR; INTRAVENOUS
Status: DISCONTINUED | OUTPATIENT
Start: 2025-03-27 | End: 2025-03-27 | Stop reason: HOSPADM

## 2025-03-27 RX ORDER — SODIUM CHLORIDE 0.9 % (FLUSH) 0.9 %
5-40 SYRINGE (ML) INJECTION EVERY 12 HOURS SCHEDULED
Status: DISCONTINUED | OUTPATIENT
Start: 2025-03-27 | End: 2025-03-27 | Stop reason: HOSPADM

## 2025-03-27 RX ORDER — ROCURONIUM BROMIDE 10 MG/ML
INJECTION, SOLUTION INTRAVENOUS
Status: DISCONTINUED | OUTPATIENT
Start: 2025-03-27 | End: 2025-03-27 | Stop reason: SDUPTHER

## 2025-03-27 RX ORDER — FENTANYL CITRATE 50 UG/ML
INJECTION, SOLUTION INTRAMUSCULAR; INTRAVENOUS
Status: DISCONTINUED | OUTPATIENT
Start: 2025-03-27 | End: 2025-03-27 | Stop reason: SDUPTHER

## 2025-03-27 RX ORDER — ACETAMINOPHEN 500 MG
1000 TABLET ORAL ONCE
Status: COMPLETED | OUTPATIENT
Start: 2025-03-27 | End: 2025-03-27

## 2025-03-27 RX ORDER — LIDOCAINE HYDROCHLORIDE 20 MG/ML
INJECTION, SOLUTION EPIDURAL; INFILTRATION; INTRACAUDAL; PERINEURAL
Status: DISCONTINUED | OUTPATIENT
Start: 2025-03-27 | End: 2025-03-27 | Stop reason: SDUPTHER

## 2025-03-27 RX ORDER — OXYCODONE HYDROCHLORIDE 5 MG/1
10 TABLET ORAL PRN
Status: DISCONTINUED | OUTPATIENT
Start: 2025-03-27 | End: 2025-03-27 | Stop reason: HOSPADM

## 2025-03-27 RX ORDER — NALOXONE HYDROCHLORIDE 0.4 MG/ML
INJECTION, SOLUTION INTRAMUSCULAR; INTRAVENOUS; SUBCUTANEOUS PRN
Status: DISCONTINUED | OUTPATIENT
Start: 2025-03-27 | End: 2025-03-27 | Stop reason: HOSPADM

## 2025-03-27 RX ADMIN — LIDOCAINE HYDROCHLORIDE 80 MG: 20 INJECTION, SOLUTION EPIDURAL; INFILTRATION; INTRACAUDAL; PERINEURAL at 09:50

## 2025-03-27 RX ADMIN — ONDANSETRON 4 MG: 2 INJECTION, SOLUTION INTRAMUSCULAR; INTRAVENOUS at 10:49

## 2025-03-27 RX ADMIN — LEVOFLOXACIN 500 MG: 5 INJECTION, SOLUTION INTRAVENOUS at 09:55

## 2025-03-27 RX ADMIN — ROCURONIUM BROMIDE 50 MG: 10 INJECTION, SOLUTION INTRAVENOUS at 09:50

## 2025-03-27 RX ADMIN — DEXAMETHASONE SODIUM PHOSPHATE 4 MG: 4 INJECTION INTRA-ARTICULAR; INTRALESIONAL; INTRAMUSCULAR; INTRAVENOUS; SOFT TISSUE at 10:03

## 2025-03-27 RX ADMIN — SODIUM CHLORIDE, SODIUM LACTATE, POTASSIUM CHLORIDE, AND CALCIUM CHLORIDE: 600; 310; 30; 20 INJECTION, SOLUTION INTRAVENOUS at 09:44

## 2025-03-27 RX ADMIN — PHENYLEPHRINE HYDROCHLORIDE 100 MCG: 0.1 INJECTION, SOLUTION INTRAVENOUS at 10:18

## 2025-03-27 RX ADMIN — PROPOFOL 200 MG: 10 INJECTION, EMULSION INTRAVENOUS at 09:50

## 2025-03-27 RX ADMIN — GLYCOPYRROLATE 0.6 MG: 0.2 INJECTION INTRAMUSCULAR; INTRAVENOUS at 10:53

## 2025-03-27 RX ADMIN — SODIUM CHLORIDE, SODIUM LACTATE, POTASSIUM CHLORIDE, AND CALCIUM CHLORIDE: 600; 310; 30; 20 INJECTION, SOLUTION INTRAVENOUS at 09:39

## 2025-03-27 RX ADMIN — ACETAMINOPHEN 1000 MG: 500 TABLET, FILM COATED ORAL at 09:39

## 2025-03-27 RX ADMIN — NEOSTIGMINE METHYLSULFATE 4 MG: 1 INJECTION, SOLUTION INTRAVENOUS at 10:53

## 2025-03-27 RX ADMIN — FENTANYL CITRATE 100 MCG: 50 INJECTION, SOLUTION INTRAMUSCULAR; INTRAVENOUS at 09:50

## 2025-03-27 ASSESSMENT — PAIN - FUNCTIONAL ASSESSMENT
PAIN_FUNCTIONAL_ASSESSMENT: 0-10
PAIN_FUNCTIONAL_ASSESSMENT: NONE - DENIES PAIN

## 2025-03-27 NOTE — BRIEF OP NOTE
Brief Postoperative Note      Patient: Vijay Godinez  YOB: 1955  MRN: [id number]    Date of Procedure: 3/27/2025    Pre-Op Diagnosis Codes:      * Kidney stone [N20.0] LEFT    Post-Op Diagnosis: Same       Procedure(s):  CYSTOSCOPY, LEFT   URETEROSCOPY,  LASER LITHOTRIPSY, LEFT  URETERAL STENT PLACEMENT LEFT RPG    Surgeon(s):  Nguyen Cervantes DO    Assistant:  * No surgical staff found *    Anesthesia: General    Estimated Blood Loss (mL): 10cc    Complications: none immediate    Specimens:   * No specimens in log *    Implants:  Implant Name Type Inv. Item Serial No.  Lot No. LRB No. Used Action   STENT URET 6FR L26CM HYDR+ PGTL TAPR TIP GRAD BLDR MRK LO - [id number]  STENT URET 6FR L26CM HYDR+ PGTL TAPR TIP GRAD BLDR MRK LO  [employer] [facility]- [id number] Left 1 Implanted         Drains:   Ureteral Drain/Stent 03/27/25 Left Ureter (Active)       Urinary Catheter 03/27/25 2 Way;Torres;Other (comment) (Active)         Electronically signed by NGUYEN CERVANTES DO on 3/27/2025 at 11:00 AM

## 2025-03-27 NOTE — ANESTHESIA PRE PROCEDURE
Department of Anesthesiology  Preprocedure Note       Name:  Vijay Godinez   Age:  69 y.o.  :  1955                                          MRN:  [id number]         Date:  3/27/2025      Surgeon: Surgeon(s):  Johnny Mccabe DO    Procedure: Procedure(s):  CYSTOSCOPY, LEFT   URETEROSCOPY,  LASER LITHOTRIPSY, LEFT  URETERAL STENT PLACEMENT    Medications prior to admission:   Prior to Admission medications    Medication Sig Start Date End Date Taking? Authorizing Provider   amLODIPine-benazepril (LOTREL) 5-10 MG per capsule Take 1 capsule by mouth daily 25  Yes Nomi Collins MD   acetaminophen (TYLENOL) 500 MG tablet Take 1 tablet by mouth every 6 hours as needed for Pain   Yes Provider, MD Gerda   tadalafil (CIALIS) 5 MG tablet Take 1 tablet by mouth daily 25   Nomi Collins MD       Current medications:    Current Facility-Administered Medications   Medication Dose Route Frequency Provider Last Rate Last Admin   • lidocaine 1 % injection 1 mL  1 mL IntraDERmal Once PRN VINOD Romero MD       • acetaminophen (TYLENOL) tablet 1,000 mg  1,000 mg Oral Once VINOD Romero MD       • lactated ringers infusion   IntraVENous Continuous VINOD Romero MD       • sodium chloride flush 0.9 % injection 5-40 mL  5-40 mL IntraVENous 2 times per day VINOD Romero MD       • sodium chloride flush 0.9 % injection 5-40 mL  5-40 mL IntraVENous PRN VINOD Romero MD       • 0.9 % sodium chloride infusion   IntraVENous PRN VINOD Romero MD       • midazolam PF (VERSED) injection 2 mg  2 mg IntraVENous Once PRN VINOD Romero MD       • levoFLOXacin (LEVAQUIN) 500 MG/100ML infusion 500 mg  500 mg IntraVENous On Call to OR Johnny Mccabe DO           Allergies:  No Known Allergies    Problem List:    Patient Active Problem List   Diagnosis Code   • History of colon polyps Z86.0100   • History of anemia Z86.2   • Tremor R25.1   • Class 2 severe

## 2025-03-27 NOTE — DISCHARGE INSTRUCTIONS
If you have had surgery in the past 7-10 days by one of our providers and are having fever, bleeding, or drainage from an incision, have an opening in an incision, or having issues urinating properly, please call [phone] during normal office hours. Please call [phone] for any immediate needs AFTER HOURS.     Ureteral Stent Placement: What to Expect at Home  Your Recovery  A ureteral (say \"you-REE-ter-ul\") stent is a thin, hollow tube that is placed in the ureter to help urine pass from the kidney into the bladder. Ureters are the tubes that connect the kidneys to the bladder.  You may have a small amount of blood in your urine for 1 to 3 days after the procedure.  While the stent is in place, you may have to urinate more often, feel a sudden need to urinate, or feel like you cannot completely empty your bladder. You may feel some pain when you urinate or do strenuous activity. You also may notice a small amount of blood in your urine after strenuous activities. These side effects usually do not prevent people from doing their normal daily activities. You may have a string attached to your stent. Do not to pull the string unless the doctor tells you to. The doctor will use the string to pull out the stent when you no longer need it.  After the procedure, urine may flow better from your kidneys to your bladder. A ureteral stent may be left in place for several days or for as long as several months. Your doctor will take it out when you no longer need it.    Cystoscopy: What to Expect at Home  Your Recovery  A cystoscopy is a procedure that lets a doctor look inside of the bladder and the urethra. The urethra is the tube that carries urine from the bladder to outside the body. The doctor uses a thin, lighted tube called a cystoscope to look for kidney or bladder stones, tumors, bleeding, or infection. After the cystoscopy, your urethra may be sore at first, and it may burn when you urinate for the first few

## 2025-03-27 NOTE — OP NOTE
[address]  [address]                            OPERATIVE REPORT      PATIENT NAME: JODI SEGOVIA         : 1955  MED REC NO: [id number]                       ROOM: [facility] NO: [id number]                       ADMIT DATE: 2025  PROVIDER: Johnny Mccabe DO    DATE OF SERVICE:  2025    PREOPERATIVE DIAGNOSES:  Left renal stones and questionable left distal ureteral stone.    POSTOPERATIVE DIAGNOSES:  Left renal stones.    PROCEDURES PERFORMED:  Cystoscopy, left ureteroscopy, laser lithotripsy, left retrograde pyelogram, left ureteral stent placement, and Torres catheter placement.    SURGEON:  Johnny Mccabe DO    ASSISTANT:  None.    ANESTHESIA:  General.    ESTIMATED BLOOD LOSS:  10 mL.    SPECIMENS REMOVED:  None.    INTRAOPERATIVE FINDINGS:  Please see dictated operative note.     COMPLICATIONS:  None immediate.    IMPLANTS:  Left ureteral stent.    INDICATIONS:  This is a 69-year-old gentleman, who I have followed for BPH as well as a history of stones.  Recent microscopic hematuria workup showed significant left-sided renal stone burden.  All risks, benefits, and alternatives of above-mentioned procedure have been discussed and he is willing to proceed at this time.    DESCRIPTION OF PROCEDURE:  Patient's consent was obtained.  The patient was brought back to the operating room at which time he was placed in the supine position.  After the uneventful induction of general anesthesia, he was then placed in the dorsal lithotomy position.  His genital area was prepped and draped and a sterile field applied.  A 22-Luxembourgish cystoscope was passed into urethra and advanced into the bladder under direct visualization.  The urethra was unremarkable.  Significant trilobar hypertrophy of the prostate was noted.  The prostate was also highly vascular and friable.  The bladder was systematically surveyed.  No

## 2025-03-27 NOTE — H&P
[facility]  [address] [place] [address]  [phone]    Vijay Godinez  : 1955     HPI   69 y.o., male returns in follow up for Ca and renal stones.  PSA was up to 6.3 on 24. MRI on 24 shows two pirads 4 R lesions and a pirads 5 LM lesion.  Fusion biopsies on 6/10/24 showed Cj 6 in three cores (RB, RLB, RLM).  Vol was 158g.  Prior R orchiopexy for a R UDT.  No new complaints.  PSA is now 6.2 on 25.  CT on 25 shows a 1.5cm LRP stone with small peripheral stones and a questionable small L distal stone on personal reviewed.  KUB confirmed these findings.  He denies any dysuria or recent fevers.         Past Medical History:   Diagnosis Date    Chronic pain of right knee     Elevated prostate specific antigen (PSA)     Essential hypertension     managed with medication    Family history of factor V deficiency     patient's son dx 2024    Gastroesophageal reflux disease     pt denies 3/24/25    History of acute pancreatitis     History of anemia     History of blood transfusion     d/t ulcer    Iron deficiency anemia     Kidney stone     Multiple thyroid nodules     Osteoporosis     Peptic ulcer disease     Primary hyperparathyroidism     Followed by endocrinology    Prostate cancer (HCC)     Sleep apnea     CPAP QHS    Vitamin D deficiency      Past Surgical History:   Procedure Laterality Date    COLONOSCOPY      Dr. Argueta    COLONOSCOPY  2021    Dr. Argueta; 1 polyp; diverticulosis    ORCHIOPEXY Right     3rd grade    PARATHYROID GLAND SURGERY N/A 2024    PARATHYROIDECTOMY performed by Phillip Lam MD at [facility] MAIN OR    PARATHYROIDECTOMY Right 2024    R inferior adenoma- Carole    PROSTATE BIOPSY N/A 6/10/2024    PROSTATE BIOPSY FUSION performed by Johnny Mccabe DO at [facility] MAIN OR    TOTAL KNEE ARTHROPLASTY Right 12/3/2024    KNEE TOTAL ARTHROPLASTY ROBOTIC VELYS-RIGHT SDD performed by Rob Galeana

## 2025-03-27 NOTE — ANESTHESIA POSTPROCEDURE EVALUATION
Department of Anesthesiology  Postprocedure Note    Patient: Vijay Godinez  MRN: [id number]  YOB: 1955  Date of evaluation: 3/27/2025    Procedure Summary       Date: 03/27/25 Room / Location: [facility] MAIN OR  / [facility] MAIN OR    Anesthesia Start: 0938 Anesthesia Stop: 1106    Procedure: CYSTOSCOPY, LEFT   URETEROSCOPY,  LASER LITHOTRIPSY, LEFT  URETERAL STENT PLACEMENT (Left: Ureter) Diagnosis:       Kidney stone      (Kidney stone [N20.0])    Providers: Johnny Mccabe DO Responsible Provider: VINOD Romero MD    Anesthesia Type: general ASA Status: 2            Anesthesia Type: No value filed.    Olivia Phase I: Olivia Score: 7    Olivia Phase II:      Anesthesia Post Evaluation    Patient location during evaluation: PACU  Patient participation: complete - patient participated  Level of consciousness: awake and alert  Airway patency: patent  Nausea & Vomiting: no nausea and no vomiting  Cardiovascular status: hemodynamically stable  Respiratory status: acceptable  Hydration status: euvolemic  Comments: Blood pressure (!) 147/69, pulse 97, temperature 97.7 °F (36.5 °C), temperature source Temporal, resp. rate 16, height 1.88 m (6' 2\"), weight 112.9 kg (249 lb), SpO2 97%.    No apparent anesthetic complications.  Pt stable for discharge from PACU  Multimodal analgesia pain management approach  Pain management: adequate    No notable events documented.

## 2025-04-01 ENCOUNTER — TELEPHONE (OUTPATIENT)
Dept: UROLOGY | Age: 70
End: 2025-04-01

## 2025-04-07 ENCOUNTER — PROCEDURE VISIT (OUTPATIENT)
Dept: UROLOGY | Age: 70
End: 2025-04-07
Payer: MEDICARE

## 2025-04-07 DIAGNOSIS — C61 PROSTATE CANCER (HCC): ICD-10-CM

## 2025-04-07 DIAGNOSIS — N20.0 KIDNEY STONE: Primary | ICD-10-CM

## 2025-04-07 LAB
BILIRUBIN, URINE, POC: NEGATIVE
BLOOD URINE, POC: NORMAL
GLUCOSE URINE, POC: NEGATIVE MG/DL
KETONES, URINE, POC: NEGATIVE MG/DL
LEUKOCYTE ESTERASE, URINE, POC: NORMAL
NITRITE, URINE, POC: NEGATIVE
PH, URINE, POC: 6 (ref 4.6–8)
PROTEIN,URINE, POC: 300 MG/DL
SPECIFIC GRAVITY, URINE, POC: 1.02 (ref 1–1.03)
URINALYSIS CLARITY, POC: NORMAL
URINALYSIS COLOR, POC: NORMAL
UROBILINOGEN, POC: NORMAL MG/DL

## 2025-04-07 PROCEDURE — 74018 RADEX ABDOMEN 1 VIEW: CPT | Performed by: UROLOGY

## 2025-04-07 PROCEDURE — 52310 CYSTOSCOPY AND TREATMENT: CPT | Performed by: UROLOGY

## 2025-04-07 PROCEDURE — 81003 URINALYSIS AUTO W/O SCOPE: CPT | Performed by: UROLOGY

## 2025-04-07 NOTE — PROGRESS NOTES
[facility]  Bacteria - 0  Epith - 0          Cystoscopy Procedure:     Procedure Room # 3  Assistant: JONEL      All risks, benefits and alternatives were again reviewed with patient and he is willing to proceed at this time.  His genital area was prepped and draped and a sterile field applied. 2% lidocaine jelly was injected in the the urethra and allowed to dwell for several minutes.  A flexible cystoscope was then inserted into the urethral meatus and advanced under direct vision.  The anterior and posterior urethra appeared normal in appearance.  The stent string was visualized in the bladder and removed intact.  The cystoscope was then removed under direct vision.  The patient tolerated the procedure well.    Assessment and Plan    ICD-10-CM    1. Kidney stone  N20.0 AMB POC URINALYSIS DIP STICK AUTO W/O MICRO     AMB POC XRAY ABDOMEN 1 VIEW     CYSTOSCOPY,REMV CALCULUS,SIMPLE      2. Prostate cancer (HCC)  C61         Stone prevention reviewed.  RTO in 3 mo with PSA prior.    NGUYEN CERVANTES, DO

## 2025-04-22 ENCOUNTER — OFFICE VISIT (OUTPATIENT)
Dept: SLEEP MEDICINE | Age: 70
End: 2025-04-22
Payer: MEDICARE

## 2025-04-22 VITALS
OXYGEN SATURATION: 97 % | HEIGHT: 74 IN | RESPIRATION RATE: 14 BRPM | SYSTOLIC BLOOD PRESSURE: 136 MMHG | HEART RATE: 90 BPM | DIASTOLIC BLOOD PRESSURE: 86 MMHG | WEIGHT: 250 LBS | BODY MASS INDEX: 32.08 KG/M2

## 2025-04-22 DIAGNOSIS — G47.34 NOCTURNAL HYPOXEMIA: ICD-10-CM

## 2025-04-22 DIAGNOSIS — E66.811 OBESITY (BMI 30.0-34.9): ICD-10-CM

## 2025-04-22 DIAGNOSIS — G47.10 HYPERSOMNIA: ICD-10-CM

## 2025-04-22 DIAGNOSIS — G47.00 PERSISTENT DISORDER OF INITIATING OR MAINTAINING SLEEP: ICD-10-CM

## 2025-04-22 DIAGNOSIS — G47.33 OSA (OBSTRUCTIVE SLEEP APNEA): Primary | ICD-10-CM

## 2025-04-22 PROCEDURE — 1159F MED LIST DOCD IN RCRD: CPT | Performed by: NURSE PRACTITIONER

## 2025-04-22 PROCEDURE — 99213 OFFICE O/P EST LOW 20 MIN: CPT | Performed by: NURSE PRACTITIONER

## 2025-04-22 PROCEDURE — G8427 DOCREV CUR MEDS BY ELIG CLIN: HCPCS | Performed by: NURSE PRACTITIONER

## 2025-04-22 PROCEDURE — G8417 CALC BMI ABV UP PARAM F/U: HCPCS | Performed by: NURSE PRACTITIONER

## 2025-04-22 PROCEDURE — 1123F ACP DISCUSS/DSCN MKR DOCD: CPT | Performed by: NURSE PRACTITIONER

## 2025-04-22 PROCEDURE — 3017F COLORECTAL CA SCREEN DOC REV: CPT | Performed by: NURSE PRACTITIONER

## 2025-04-22 PROCEDURE — G2211 COMPLEX E/M VISIT ADD ON: HCPCS | Performed by: NURSE PRACTITIONER

## 2025-04-22 PROCEDURE — 3079F DIAST BP 80-89 MM HG: CPT | Performed by: NURSE PRACTITIONER

## 2025-04-22 PROCEDURE — 3075F SYST BP GE 130 - 139MM HG: CPT | Performed by: NURSE PRACTITIONER

## 2025-04-22 PROCEDURE — 1036F TOBACCO NON-USER: CPT | Performed by: NURSE PRACTITIONER

## 2025-04-22 PROCEDURE — 1160F RVW MEDS BY RX/DR IN RCRD: CPT | Performed by: NURSE PRACTITIONER

## 2025-04-22 ASSESSMENT — SLEEP AND FATIGUE QUESTIONNAIRES
HOW LIKELY ARE YOU TO NOD OFF OR FALL ASLEEP WHILE SITTING AND TALKING TO SOMEONE: WOULD NEVER DOZE
HOW LIKELY ARE YOU TO NOD OFF OR FALL ASLEEP WHILE LYING DOWN TO REST IN THE AFTERNOON WHEN CIRCUMSTANCES PERMIT: SLIGHT CHANCE OF DOZING
HOW LIKELY ARE YOU TO NOD OFF OR FALL ASLEEP WHILE SITTING INACTIVE IN A PUBLIC PLACE: SLIGHT CHANCE OF DOZING
HOW LIKELY ARE YOU TO NOD OFF OR FALL ASLEEP WHILE WATCHING TV: MODERATE CHANCE OF DOZING
HOW LIKELY ARE YOU TO NOD OFF OR FALL ASLEEP WHILE WATCHING TV: MODERATE CHANCE OF DOZING
HOW LIKELY ARE YOU TO NOD OFF OR FALL ASLEEP IN A CAR, WHILE STOPPED FOR A FEW MINUTES IN TRAFFIC: WOULD NEVER DOZE
HOW LIKELY ARE YOU TO NOD OFF OR FALL ASLEEP WHILE SITTING AND READING: SLIGHT CHANCE OF DOZING
HOW LIKELY ARE YOU TO NOD OFF OR FALL ASLEEP WHILE SITTING QUIETLY AFTER LUNCH WITHOUT ALCOHOL: SLIGHT CHANCE OF DOZING
HOW LIKELY ARE YOU TO NOD OFF OR FALL ASLEEP WHILE SITTING QUIETLY AFTER LUNCH WITHOUT ALCOHOL: SLIGHT CHANCE OF DOZING
HOW LIKELY ARE YOU TO NOD OFF OR FALL ASLEEP IN A CAR, WHILE STOPPED FOR A FEW MINUTES IN TRAFFIC: WOULD NEVER DOZE
HOW LIKELY ARE YOU TO NOD OFF OR FALL ASLEEP WHILE SITTING AND READING: SLIGHT CHANCE OF DOZING
HOW LIKELY ARE YOU TO NOD OFF OR FALL ASLEEP WHEN YOU ARE A PASSENGER IN A CAR FOR AN HOUR WITHOUT A BREAK: WOULD NEVER DOZE
ESS TOTAL SCORE: 6
HOW LIKELY ARE YOU TO NOD OFF OR FALL ASLEEP WHILE SITTING INACTIVE IN A PUBLIC PLACE: SLIGHT CHANCE OF DOZING
HOW LIKELY ARE YOU TO NOD OFF OR FALL ASLEEP WHEN YOU ARE A PASSENGER IN A CAR FOR AN HOUR WITHOUT A BREAK: WOULD NEVER DOZE
HOW LIKELY ARE YOU TO NOD OFF OR FALL ASLEEP WHILE LYING DOWN TO REST IN THE AFTERNOON WHEN CIRCUMSTANCES PERMIT: SLIGHT CHANCE OF DOZING
HOW LIKELY ARE YOU TO NOD OFF OR FALL ASLEEP WHILE SITTING AND TALKING TO SOMEONE: WOULD NEVER DOZE

## 2025-04-22 NOTE — PATIENT INSTRUCTIONS
Continue CPAP at new pressure settings of 7-12 cm H2O with nightly compliance  New CPAP supplies ordered  Recommendations as above  Follow-up in 6 months or sooner if needed

## 2025-04-22 NOTE — PROGRESS NOTES
)-Humidifier non-heated (1 per 5 yrs)      Signed Date: 4/22/2025  Electronically Signed By: SHARITA Wasserman CNP  Electronically Dated:  4/22/2025             Collaborating Physician: Dr. Rodriguez    Today's office visit was spent  reviewing test results, prognosis, importance of compliance, education about disease process, benefits of medications, instructions for management of acute flare-ups, and follow up plans.  Total time spent with patient was 20 minutes.        SHARITA Wasserman CNP  Electronically signed

## 2025-06-04 ENCOUNTER — TELEPHONE (OUTPATIENT)
Dept: UROLOGY | Age: 70
End: 2025-06-04

## 2025-06-04 DIAGNOSIS — N39.0 URINARY TRACT INFECTION WITHOUT HEMATURIA, SITE UNSPECIFIED: Primary | ICD-10-CM

## 2025-06-16 ENCOUNTER — OFFICE VISIT (OUTPATIENT)
Dept: ORTHOPEDIC SURGERY | Age: 70
End: 2025-06-16
Payer: MEDICARE

## 2025-06-16 DIAGNOSIS — Z09 FOLLOW-UP EXAMINATION: ICD-10-CM

## 2025-06-16 DIAGNOSIS — Z96.651 HISTORY OF TOTAL KNEE ARTHROPLASTY, RIGHT: Primary | ICD-10-CM

## 2025-06-16 PROCEDURE — G8417 CALC BMI ABV UP PARAM F/U: HCPCS | Performed by: PHYSICIAN ASSISTANT

## 2025-06-16 PROCEDURE — 3017F COLORECTAL CA SCREEN DOC REV: CPT | Performed by: ORTHOPAEDIC SURGERY

## 2025-06-16 PROCEDURE — 99213 OFFICE O/P EST LOW 20 MIN: CPT | Performed by: PHYSICIAN ASSISTANT

## 2025-06-16 PROCEDURE — 1123F ACP DISCUSS/DSCN MKR DOCD: CPT | Performed by: PHYSICIAN ASSISTANT

## 2025-06-16 PROCEDURE — G8428 CUR MEDS NOT DOCUMENT: HCPCS | Performed by: PHYSICIAN ASSISTANT

## 2025-06-16 PROCEDURE — 1036F TOBACCO NON-USER: CPT | Performed by: ORTHOPAEDIC SURGERY

## 2025-06-16 NOTE — PROGRESS NOTES
Name: Vijay Godinez  YOB: 1955  Gender: male  MRN: [id number]    RIGHT Post-Op TKA 6 month follow up    This patient returns now 6 months s/p RIGHT TKA. The patient is doing well and is happy with their level of function. The patient states they are able to ascend stairs normally.  The patient ambulates with no assistive device. The patient takes anti-inflammatories or other pain medication rarely for discomfort related to the operative side.           Interval medical and surgical history is noted in the patient history form and updated today.      Past Medical History:    Allergies:  No Known Allergies    Medications:  Current Outpatient Medications   Medication Sig    amLODIPine-benazepril (LOTREL) 5-10 MG per capsule Take 1 capsule by mouth daily    tadalafil (CIALIS) 5 MG tablet Take 1 tablet by mouth daily    acetaminophen (TYLENOL) 500 MG tablet Take 1 tablet by mouth every 6 hours as needed for Pain     No current facility-administered medications for this visit.       Past Medical history:  Past Medical History:   Diagnosis Date    Chronic pain of right knee     Elevated prostate specific antigen (PSA)     Essential hypertension     managed with medication    Family history of factor V deficiency     patient's son dx 9/2024    Gastroesophageal reflux disease     pt denies 3/24/25    History of acute pancreatitis 2007    History of anemia     History of blood transfusion 2007    d/t ulcer    Iron deficiency anemia     Kidney stone     Multiple thyroid nodules     Osteoporosis     Peptic ulcer disease 2007    Primary hyperparathyroidism     Followed by endocrinology    Prostate cancer (HCC)     Sleep apnea     CPAP QHS    Vitamin D deficiency         has a past surgical history that includes Colonoscopy (2008); orchiopexy (Right); Colonoscopy (02/12/2021); parathyroidectomy (Right, 05/09/2024); Parathyroid gland surgery (N/A, 5/9/2024); Prostate biopsy (N/A, 6/10/2024); Total knee

## 2025-06-30 ENCOUNTER — OFFICE VISIT (OUTPATIENT)
Dept: ENDOCRINOLOGY | Age: 70
End: 2025-06-30
Payer: MEDICARE

## 2025-06-30 VITALS
HEART RATE: 70 BPM | BODY MASS INDEX: 32.21 KG/M2 | DIASTOLIC BLOOD PRESSURE: 76 MMHG | HEIGHT: 74 IN | OXYGEN SATURATION: 98 % | SYSTOLIC BLOOD PRESSURE: 128 MMHG | RESPIRATION RATE: 18 BRPM | WEIGHT: 251 LBS

## 2025-06-30 DIAGNOSIS — Z86.39 HISTORY OF PRIMARY HYPERPARATHYROIDISM: Primary | ICD-10-CM

## 2025-06-30 DIAGNOSIS — E04.2 MULTIPLE THYROID NODULES: ICD-10-CM

## 2025-06-30 DIAGNOSIS — E55.9 VITAMIN D DEFICIENCY: ICD-10-CM

## 2025-06-30 DIAGNOSIS — M81.0 AGE-RELATED OSTEOPOROSIS WITHOUT CURRENT PATHOLOGICAL FRACTURE: ICD-10-CM

## 2025-06-30 PROCEDURE — 3017F COLORECTAL CA SCREEN DOC REV: CPT | Performed by: INTERNAL MEDICINE

## 2025-06-30 PROCEDURE — 3074F SYST BP LT 130 MM HG: CPT | Performed by: INTERNAL MEDICINE

## 2025-06-30 PROCEDURE — G8417 CALC BMI ABV UP PARAM F/U: HCPCS | Performed by: INTERNAL MEDICINE

## 2025-06-30 PROCEDURE — 1159F MED LIST DOCD IN RCRD: CPT | Performed by: INTERNAL MEDICINE

## 2025-06-30 PROCEDURE — 1036F TOBACCO NON-USER: CPT | Performed by: INTERNAL MEDICINE

## 2025-06-30 PROCEDURE — 99214 OFFICE O/P EST MOD 30 MIN: CPT | Performed by: INTERNAL MEDICINE

## 2025-06-30 PROCEDURE — 76536 US EXAM OF HEAD AND NECK: CPT | Performed by: INTERNAL MEDICINE

## 2025-06-30 PROCEDURE — 3078F DIAST BP <80 MM HG: CPT | Performed by: INTERNAL MEDICINE

## 2025-06-30 PROCEDURE — G8427 DOCREV CUR MEDS BY ELIG CLIN: HCPCS | Performed by: INTERNAL MEDICINE

## 2025-06-30 PROCEDURE — 1123F ACP DISCUSS/DSCN MKR DOCD: CPT | Performed by: INTERNAL MEDICINE

## 2025-06-30 ASSESSMENT — ENCOUNTER SYMPTOMS
TROUBLE SWALLOWING: 0
CONSTIPATION: 0
DIARRHEA: 0
VOICE CHANGE: 0

## 2025-06-30 NOTE — PROGRESS NOTES
MIRIAN Vale MD, FACE    [facility] ENDOCRINOLOGY   AND   THYROID NODULE CLINIC            Reason for visit: Follow-up of primary hyperparathyroidism      ASSESSMENT AND PLAN:    1. History of primary hyperparathyroidism  He is status post parathyroidectomy 5/9/2024.  Postoperative calcium was normal.  I will check labs today.  I will provide him with longitudinal care.  - Comprehensive Metabolic Panel; Future    2. Age-related osteoporosis without current pathological fracture  This can be monitored without treatment (improvement is expected after parathyroidectomy).  I will have him repeat bone densitometry prior to the next appointment with me.  - Comprehensive Metabolic Panel; Future  - Vitamin D 25 Hydroxy; Future  - DEXA BONE DENSITY AXIAL SKELETON; Future    3. Vitamin D deficiency  Vitamin D has been replete.  - Vitamin D 25 Hydroxy; Future    4. Multiple thyroid nodules  Ultrasound was repeated today and is unchanged.  Repeat in 1 year.  - TSH reflex to FT4; Future  - US,HEAD/NECK TISSUES,REAL TIME        PROCEDURES:    HEAD AND NECK ULTRASOUND    Date of study:  6/30/2025    Performing/interpreting physician:  MIRIAN Vale MD, FACE    Indication:  thyroid nodule    Technique:  Using a 12 MHz linear transducer, multiple real-time planar images were obtained of the thyroid and surrounding tissues.      Findings:  Right lobe 2.32 x 2.16 x 3.23 cm, isthmus 0.13 cm, left lobe 2.09 x 1.29 x 3.00 cm.  Mildly heterogeneous echotexture.  Normal blood flow.  1.58 x 1.62 x 1.64 cm isoechoic nodule without calcifications or increased blood flow at the right lower pole (TR 3).  Other nodules and cysts measuring only a few millimeters in maximal diameter elsewhere.    Impression: Thyroid nodules as described.    [employer] TI-RADS recommendations:   TR1: Benign, no FNA or ultrasound follow-up   TR2: Nonsuspicious, no FNA or ultrasound follow-up   TR3: Mildly suspicious, FNA if greater than

## 2025-07-10 ENCOUNTER — LAB (OUTPATIENT)
Dept: UROLOGY | Age: 70
End: 2025-07-10

## 2025-07-10 DIAGNOSIS — C61 PROSTATE CANCER (HCC): ICD-10-CM

## 2025-07-10 LAB — PSA SERPL-MCNC: 5.4 NG/ML (ref 0–4)

## 2025-07-17 ENCOUNTER — OFFICE VISIT (OUTPATIENT)
Dept: UROLOGY | Age: 70
End: 2025-07-17
Payer: MEDICARE

## 2025-07-17 DIAGNOSIS — N52.9 ERECTILE DYSFUNCTION, UNSPECIFIED ERECTILE DYSFUNCTION TYPE: ICD-10-CM

## 2025-07-17 DIAGNOSIS — N40.1 BENIGN PROSTATIC HYPERPLASIA WITH NOCTURIA: ICD-10-CM

## 2025-07-17 DIAGNOSIS — C61 PROSTATE CANCER (HCC): Primary | ICD-10-CM

## 2025-07-17 DIAGNOSIS — R35.1 BENIGN PROSTATIC HYPERPLASIA WITH NOCTURIA: ICD-10-CM

## 2025-07-17 DIAGNOSIS — N20.0 KIDNEY STONE: ICD-10-CM

## 2025-07-17 LAB
BILIRUBIN, URINE, POC: NEGATIVE
BLOOD URINE, POC: NORMAL
GLUCOSE URINE, POC: NEGATIVE MG/DL
KETONES, URINE, POC: NEGATIVE MG/DL
LEUKOCYTE ESTERASE, URINE, POC: NEGATIVE
NITRITE, URINE, POC: NEGATIVE
PH, URINE, POC: 6 (ref 4.6–8)
PROTEIN,URINE, POC: 30 MG/DL
SPECIFIC GRAVITY, URINE, POC: 1.02 (ref 1–1.03)
URINALYSIS CLARITY, POC: NORMAL
URINALYSIS COLOR, POC: NORMAL
UROBILINOGEN, POC: NORMAL MG/DL

## 2025-07-17 PROCEDURE — G8417 CALC BMI ABV UP PARAM F/U: HCPCS | Performed by: UROLOGY

## 2025-07-17 PROCEDURE — 1036F TOBACCO NON-USER: CPT | Performed by: UROLOGY

## 2025-07-17 PROCEDURE — G8427 DOCREV CUR MEDS BY ELIG CLIN: HCPCS | Performed by: UROLOGY

## 2025-07-17 PROCEDURE — 99213 OFFICE O/P EST LOW 20 MIN: CPT | Performed by: UROLOGY

## 2025-07-17 PROCEDURE — 1160F RVW MEDS BY RX/DR IN RCRD: CPT | Performed by: UROLOGY

## 2025-07-17 PROCEDURE — 3017F COLORECTAL CA SCREEN DOC REV: CPT | Performed by: UROLOGY

## 2025-07-17 PROCEDURE — 81003 URINALYSIS AUTO W/O SCOPE: CPT | Performed by: UROLOGY

## 2025-07-17 PROCEDURE — 1123F ACP DISCUSS/DSCN MKR DOCD: CPT | Performed by: UROLOGY

## 2025-07-17 PROCEDURE — 1159F MED LIST DOCD IN RCRD: CPT | Performed by: UROLOGY

## 2025-07-17 RX ORDER — TAMSULOSIN HYDROCHLORIDE 0.4 MG/1
0.4 CAPSULE ORAL EVERY EVENING
Qty: 90 CAPSULE | Refills: 3 | Status: SHIPPED | OUTPATIENT
Start: 2025-07-17

## 2025-07-17 RX ORDER — TADALAFIL 5 MG/1
5 TABLET ORAL DAILY
Qty: 90 TABLET | Refills: 3 | Status: SHIPPED | OUTPATIENT
Start: 2025-07-17

## 2025-07-17 RX ORDER — CIPROFLOXACIN 500 MG/1
500 TABLET, FILM COATED ORAL 2 TIMES DAILY
COMMUNITY
Start: 2025-07-12 | End: 2025-07-26

## 2025-07-17 NOTE — PROGRESS NOTES
[facility]  [address] [place] [address]  [phone]    Vijay Godinez  : 1955     HPI   69 y.o., male returns in follow up for CaP and renal stones.  PSA was up to 6.3 on 24. MRI on 24 shows two pirads 4 R lesions and a pirads 5 LM lesion.  Fusion biopsies on 6/10/24 showed Cj 6 in three cores (RB, RLB, RLM).  Vol was 158g.  Prior R orchiopexy for a R UDT.  No new complaints.  PSA was 6.2 on 25 and is now 5.4 on 7/10/25.  CT on 25 showed a 1.5cm LRP stone with small peripheral stones and a questionable small L distal stone on personal reviewed.  KUB confirmed these findings.  S/P L URS, laser, stent on 3/27/25.  Unable to tx most of his LLP stone burden given angulation.  KUB at last visit showed persistent LLP stone burden.  Reports several recent E coli UTIs.  PVR today is 114cc by ultrasound.  Currently on Cipro.         Past Medical History:   Diagnosis Date    Chronic pain of right knee     Elevated prostate specific antigen (PSA)     Essential hypertension     Family history of factor V deficiency     Gastroesophageal reflux disease     History of acute pancreatitis     History of anemia     History of blood transfusion     History of primary hyperparathyroidism     Iron deficiency anemia     Kidney stone     Multiple thyroid nodules     Obstructive sleep apnea on CPAP     Osteoporosis     Peptic ulcer disease 2007    Prostate cancer (HCC)     Vitamin D deficiency      Past Surgical History:   Procedure Laterality Date    COLONOSCOPY      Dr. Argueta    COLONOSCOPY  2021    Dr. Argueta; 1 polyp; diverticulosis    CYSTOSCOPY Left 3/27/2025    CYSTOSCOPY, LEFT   URETEROSCOPY,  LASER LITHOTRIPSY, LEFT  URETERAL STENT PLACEMENT performed by Johnny Mccabe DO at [facility] MAIN OR    ORCHIOPEXY Right     3rd grade    PARATHYROID GLAND SURGERY N/A 2024    PARATHYROIDECTOMY performed by Phillip Lam MD at [facility] OR

## 2025-07-30 ENCOUNTER — CLINICAL SUPPORT (OUTPATIENT)
Dept: UROLOGY | Age: 70
End: 2025-07-30
Payer: MEDICARE

## 2025-07-30 DIAGNOSIS — N39.0 URINARY TRACT INFECTION WITHOUT HEMATURIA, SITE UNSPECIFIED: ICD-10-CM

## 2025-07-30 DIAGNOSIS — N39.0 URINARY TRACT INFECTION WITHOUT HEMATURIA, SITE UNSPECIFIED: Primary | ICD-10-CM

## 2025-07-30 LAB
BILIRUBIN, URINE, POC: NEGATIVE
BLOOD URINE, POC: NORMAL
GLUCOSE URINE, POC: NEGATIVE MG/DL
KETONES, URINE, POC: NEGATIVE MG/DL
LEUKOCYTE ESTERASE, URINE, POC: NORMAL
NITRITE, URINE, POC: NEGATIVE
PH, URINE, POC: 6 (ref 4.6–8)
PROTEIN,URINE, POC: NEGATIVE MG/DL
SPECIFIC GRAVITY, URINE, POC: 1.01 (ref 1–1.03)
URINALYSIS CLARITY, POC: NORMAL
URINALYSIS COLOR, POC: NORMAL
UROBILINOGEN, POC: NORMAL MG/DL

## 2025-07-30 PROCEDURE — 81003 URINALYSIS AUTO W/O SCOPE: CPT | Performed by: UROLOGY

## 2025-07-30 NOTE — PROGRESS NOTES
UA - Dipstick  Results for orders placed or performed in visit on 07/30/25   AMB POC URINALYSIS DIP STICK AUTO W/O MICRO    Collection Time: 07/30/25  8:56 AM   Result Value Ref Range    Color (UA POC)      Clarity (UA POC)      Glucose, Urine, POC Negative Negative mg/dL    Bilirubin, Urine, POC Negative Negative    KETONES, Urine, POC Negative Negative mg/dL    Specific Gravity, Urine, POC 1.015 1.001 - 1.035    Blood (UA POC) Trace-intact     pH, Urine, POC 6.0 4.6 - 8.0    Protein, Urine, POC Negative Negative mg/dL    Urobilinogen, POC 0.2 mg/dL <1.1 mg/dL    Nitrite, Urine, POC Negative Negative    Leukocyte Esterase, Urine, POC Small Negative       UA - Micro  WBC - 1-2  RBC - 0  Bacteria - 0  Epith - 0      Requested Prescriptions      No prescriptions requested or ordered in this encounter     Plan    Diagnosis Orders   1. Urinary tract infection without hematuria, site unspecified  AMB POC URINALYSIS DIP STICK AUTO W/O MICRO    Culture, Urine          Ucx sent.  Await culture if asymptomatic.    NGUYEN CERVANTES, DO

## 2025-08-01 LAB
BACTERIA SPEC CULT: NORMAL
SERVICE CMNT-IMP: NORMAL

## (undated) DEVICE — CORD ES L12FT BPLR FRCP

## (undated) DEVICE — BIPOLAR SEALER 23-112-1 AQM 6.0: Brand: AQUAMANTYS ®

## (undated) DEVICE — DEVICE SECUREMENT AD W/ TRICOT ANCHR PD FOR F LTX SIL CATH

## (undated) DEVICE — PTFE WIRE GUIDE WITH 3CM FLEXIBLE TIP: Brand: COOK

## (undated) DEVICE — SUTURE PERMAHAND SZ 2-0 L12X18IN NONABSORBABLE BLK SILK A185H

## (undated) DEVICE — STERILE PRESSURE PROTECTOR PAD® FOR DE MAYO UNIVERSAL DISTRACTOR® (10/CASE): Brand: DE MAYO UNIVERSAL DISTRACTOR®

## (undated) DEVICE — HOOD: Brand: T7PLUS

## (undated) DEVICE — DISPOSABLE STANDARD BIPOLAR FORCEPS, NON-STICK,: Brand: SPETZLER-MALIS

## (undated) DEVICE — STERILE SYNTHETIC POLYISOPRENE POWDER-FREE SURGICAL GLOVES WITH HYDROGEL COATING, SMOOTH FINISH, STRAIGHT FINGER: Brand: PROTEXIS

## (undated) DEVICE — DRAPE TWL SURG 16X26IN BLU ORB04] ALLCARE INC]

## (undated) DEVICE — HOOK RETRCT DIA5MM SHRP E STAY DISP FOR LONE STAR SELF RET

## (undated) DEVICE — Device

## (undated) DEVICE — SOLUTION IRRIG 3000ML H2O STRL BAG

## (undated) DEVICE — ELECTRODE PT RET AD L9FT HI MOIST COND ADH HYDRGEL CORDED

## (undated) DEVICE — DRAPE,TOP,102X53,STERILE: Brand: MEDLINE

## (undated) DEVICE — SYRINGE MED 50ML LUERLOCK TIP

## (undated) DEVICE — DEVICE IRRIG TBNG L10IN 30ML POLYVI BLB LUERLOCK FLO CTRL

## (undated) DEVICE — SINGLE-USE DIGITAL FLEXIBLE URETEROSCOPE: Brand: LITHOVUE

## (undated) DEVICE — PIN DRL 4X125 MM ROBOTIC-ASSISTED SOLUTION ARRY VELYS

## (undated) DEVICE — STERILE PVP: Brand: MEDLINE INDUSTRIES, INC.

## (undated) DEVICE — GARMENT,MEDLINE,DVT,INT,CALF,MED, GEN2: Brand: MEDLINE

## (undated) DEVICE — GLOVE SURG SZ 8 L12IN FNGR THK79MIL GRN LTX FREE

## (undated) DEVICE — PREMIUM WET SKIN PREP TRAY: Brand: MEDLINE INDUSTRIES, INC.

## (undated) DEVICE — SUTURE PERMAHAND SZ 3-0 L18IN NONABSORBABLE BLK SILK BRAID A184H

## (undated) DEVICE — DUAL CUT SAGITTAL BLADE

## (undated) DEVICE — BLADE SAW OSCILLATING 85X19X2 MM ROBOTIC-ASSISTED VELYS

## (undated) DEVICE — SOLUTION IV 250ML 0.9% SOD CHL PH 5 INJ USP VIAFLX PLAS

## (undated) DEVICE — CATHETER URETH 18FR BLLN 5CC STD LTX 2 W F TWO OPP DRNGE

## (undated) DEVICE — DRAPE EQUIP ROBOT STRL VELYS 20/PK ORDER IN MULTIIPLES OF 20 EACH

## (undated) DEVICE — MAX-CORE® DISPOSABLE CORE BIOPSY INSTRUMENT, 18G X 25CM: Brand: MAX-CORE

## (undated) DEVICE — SOLUTION IRRIG 1000ML 0.9% SOD CHL USP POUR PLAS BTL

## (undated) DEVICE — SUIT SURG ISOLATN ZIP TOGA XL T7 +

## (undated) DEVICE — BLADE ES ELASTOMERIC COAT INSUL DURABLE BEND UPTO 90DEG

## (undated) DEVICE — GLOVE SURG SZ 75 CRM LTX FREE POLYISOPRENE POLYMER BEAD ANTI

## (undated) DEVICE — STRIP,CLOSURE,WOUND,MEDI-STRIP,1/2X4: Brand: MEDLINE

## (undated) DEVICE — PAD,NON-ADHERENT,3X8,STERILE,LF,1/PK: Brand: MEDLINE

## (undated) DEVICE — BAG,DRAINAGE,4L,A/R TOWER,LL,SLIDE TAP: Brand: MEDLINE

## (undated) DEVICE — BAG DRAIN UROLOGY GENESIS NS

## (undated) DEVICE — AGENT HEMSTAT W2XL14IN OXIDIZED REGENERATED CELOS ABSRB FOR

## (undated) DEVICE — URETERAL STENT
Type: IMPLANTABLE DEVICE | Site: URETER | Status: NON-FUNCTIONAL
Brand: PERCUFLEX™ PLUS

## (undated) DEVICE — SOLUTION IRRIG 3000ML 0.9% SOD CHL USP UROMATIC PLAS CONT

## (undated) DEVICE — STERILE PACKED. BORE DIAMETER 1.6 MM; ANGLE OF INSERTION 19° TO THE LONG AXIS OF THE TRANSDUCER: Brand: SINGLE-USE BIPLANE BIOPSY GUIDE

## (undated) DEVICE — MARKER SURG SKIN UTIL BLK REG TIP NONSMEARING W/ 6IN RUL

## (undated) DEVICE — CATHETER URET 5FR L70CM OPN END SGL LUMN INJ HUB FLEXIMA

## (undated) DEVICE — ZIP DS DRESSING SHIELD: Brand: ZIP DS DRESSING SHIELD

## (undated) DEVICE — GLOVE SURG SZ 65 THK91MIL LTX FREE SYN POLYISOPRENE

## (undated) DEVICE — DRAIN SURG PENROSE 0.25X12 IN CLOSED WND DRAINAGE PREM SIL

## (undated) DEVICE — PENCIL ES L3M BTTN SWCH HOLSTER W/ BLDE ELECTRD EDGE

## (undated) DEVICE — COVER PRB W1XL11.8IN ENDOCAVITY CLR E BND NEOGUARD

## (undated) DEVICE — TOTAL KNEE: Brand: MEDLINE INDUSTRIES, INC.

## (undated) DEVICE — SUTURE VICRYL SZ 1 L27IN ABSRB UD L36MM CP-1 1/2 CIR REV CUT J268H

## (undated) DEVICE — NITINOL WIRE WITH HYDROPHILIC TIP: Brand: SENSOR

## (undated) DEVICE — APPLIER CLP L9.375IN APER 2.1MM CLS L3.8MM 20 SM TI CLP

## (undated) DEVICE — GLOVE SURG SZ 65 L12IN FNGR THK79MIL GRN LTX FREE

## (undated) DEVICE — PROBE 8225101 5PK STD PRASS FL TIP ROHS

## (undated) DEVICE — KIT PROCEDURE SURG HEAD AND NECK TOTE

## (undated) DEVICE — SUTURE VICRYL + SZ 2-0 L27IN ABSRB UD CP-1 1/2 CIR REV CUT VCP266H

## (undated) DEVICE — GOWN,REINFORCED,POLY,SIRUS,XLNG/XXLG: Brand: MEDLINE

## (undated) DEVICE — SOLUTION IRRIG 1000ML STRL H2O USP PLAS POUR BTL

## (undated) DEVICE — SPONGE: SPECIALTY PEANUT XR 100/CS: Brand: MEDICAL ACTION INDUSTRIES

## (undated) DEVICE — GLOVE ORANGE PI 8   MSG9080

## (undated) DEVICE — FLEXIVA  PULSE  AND  FLEXIVA  PULSE  TRACTIP  LASER  FIBERS  ARE  HIGH  POWER  SINGLE-USE FIBER: Brand: FLEXIVA PULSE

## (undated) DEVICE — DILATOR/SHEATH SET: Brand: 8/10 DILATOR/SHEATH SET

## (undated) DEVICE — SPONGE,NEURO,1"X1",XR,STRL,LF,10/PK: Brand: MEDLINE

## (undated) DEVICE — SYRINGE MED 10ML LUERLOCK TIP W/O SFTY DISP

## (undated) DEVICE — DRAPE,INSTRUMENT,MAGNETIC,10X16: Brand: MEDLINE

## (undated) DEVICE — SUTURE VICRYL SZ 3-0 L18IN ABSRB VLT L26MM SH 1/2 CIR J774D

## (undated) DEVICE — BLADE RMR L51MM PAT PILOT H

## (undated) DEVICE — DRAPE EQUIP SATELLITE STN STRL VELYS

## (undated) DEVICE — SUTURE ABSORBABLE MONOFILAMENT 1 CTX 36 CM 48 MM VIO PDS +